# Patient Record
Sex: MALE | Race: WHITE | NOT HISPANIC OR LATINO | Employment: FULL TIME | URBAN - NONMETROPOLITAN AREA
[De-identification: names, ages, dates, MRNs, and addresses within clinical notes are randomized per-mention and may not be internally consistent; named-entity substitution may affect disease eponyms.]

---

## 2017-04-02 ENCOUNTER — HOSPITAL ENCOUNTER (EMERGENCY)
Facility: HOSPITAL | Age: 22
Discharge: HOME | End: 2017-04-03
Attending: EMERGENCY MEDICINE | Admitting: EMERGENCY MEDICINE
Payer: COMMERCIAL

## 2017-04-02 ENCOUNTER — APPOINTMENT (EMERGENCY)
Dept: RADIOLOGY | Facility: HOSPITAL | Age: 22
End: 2017-04-02
Payer: COMMERCIAL

## 2017-04-02 DIAGNOSIS — R50.9 FEVER: Primary | ICD-10-CM

## 2017-04-02 DIAGNOSIS — B34.9 VIRAL SYNDROME: ICD-10-CM

## 2017-04-02 LAB
BASOPHILS # BLD AUTO: 0.04 THOUSANDS/ΜL (ref 0–0.1)
BASOPHILS NFR BLD AUTO: 1 % (ref 0–1)
EOSINOPHIL # BLD AUTO: 0.24 THOUSAND/ΜL (ref 0–0.61)
EOSINOPHIL NFR BLD AUTO: 3 % (ref 0–6)
ERYTHROCYTE [DISTWIDTH] IN BLOOD BY AUTOMATED COUNT: 11.8 % (ref 11.6–15.1)
HCT VFR BLD AUTO: 42.9 % (ref 36.5–49.3)
HGB BLD-MCNC: 14.6 G/DL (ref 12–17)
LYMPHOCYTES # BLD AUTO: 1.47 THOUSANDS/ΜL (ref 0.6–4.47)
LYMPHOCYTES NFR BLD AUTO: 17 % (ref 14–44)
MCH RBC QN AUTO: 31.1 PG (ref 26.8–34.3)
MCHC RBC AUTO-ENTMCNC: 34 G/DL (ref 31.4–37.4)
MCV RBC AUTO: 92 FL (ref 82–98)
MONOCYTES # BLD AUTO: 1.28 THOUSAND/ΜL (ref 0.17–1.22)
MONOCYTES NFR BLD AUTO: 15 % (ref 4–12)
NEUTROPHILS # BLD AUTO: 5.71 THOUSANDS/ΜL (ref 1.85–7.62)
NEUTS SEG NFR BLD AUTO: 64 % (ref 43–75)
PLATELET # BLD AUTO: 232 THOUSANDS/UL (ref 149–390)
PMV BLD AUTO: 9.8 FL (ref 8.9–12.7)
RBC # BLD AUTO: 4.69 MILLION/UL (ref 3.88–5.62)
S PYO AG THROAT QL: NEGATIVE
WBC # BLD AUTO: 8.74 THOUSAND/UL (ref 4.31–10.16)

## 2017-04-02 PROCEDURE — 71020 HB CHEST X-RAY 2VW FRONTAL&LATL: CPT

## 2017-04-02 PROCEDURE — 36415 COLL VENOUS BLD VENIPUNCTURE: CPT | Performed by: EMERGENCY MEDICINE

## 2017-04-02 PROCEDURE — 87400 INFLUENZA A/B EACH AG IA: CPT | Performed by: EMERGENCY MEDICINE

## 2017-04-02 PROCEDURE — 85025 COMPLETE CBC W/AUTO DIFF WBC: CPT | Performed by: EMERGENCY MEDICINE

## 2017-04-02 PROCEDURE — 80053 COMPREHEN METABOLIC PANEL: CPT | Performed by: EMERGENCY MEDICINE

## 2017-04-02 PROCEDURE — 87430 STREP A AG IA: CPT | Performed by: EMERGENCY MEDICINE

## 2017-04-02 PROCEDURE — 87070 CULTURE OTHR SPECIMN AEROBIC: CPT | Performed by: EMERGENCY MEDICINE

## 2017-04-02 PROCEDURE — 87798 DETECT AGENT NOS DNA AMP: CPT | Performed by: EMERGENCY MEDICINE

## 2017-04-02 PROCEDURE — 86308 HETEROPHILE ANTIBODY SCREEN: CPT | Performed by: EMERGENCY MEDICINE

## 2017-04-02 PROCEDURE — 96360 HYDRATION IV INFUSION INIT: CPT

## 2017-04-02 RX ORDER — IBUPROFEN 400 MG/1
TABLET ORAL
Status: DISCONTINUED
Start: 2017-04-02 | End: 2017-04-03 | Stop reason: HOSPADM

## 2017-04-02 RX ORDER — IBUPROFEN 400 MG/1
400 TABLET ORAL ONCE
Status: COMPLETED | OUTPATIENT
Start: 2017-04-02 | End: 2017-04-02

## 2017-04-02 RX ADMIN — SODIUM CHLORIDE 1000 ML: 0.9 INJECTION, SOLUTION INTRAVENOUS at 23:37

## 2017-04-02 RX ADMIN — IBUPROFEN 400 MG: 400 TABLET ORAL at 22:33

## 2017-04-03 VITALS
HEIGHT: 75 IN | DIASTOLIC BLOOD PRESSURE: 70 MMHG | BODY MASS INDEX: 19.52 KG/M2 | SYSTOLIC BLOOD PRESSURE: 125 MMHG | OXYGEN SATURATION: 99 % | WEIGHT: 156.97 LBS | TEMPERATURE: 98.8 F | RESPIRATION RATE: 16 BRPM | HEART RATE: 81 BPM

## 2017-04-03 LAB
ALBUMIN SERPL BCP-MCNC: 3.7 G/DL (ref 3.5–5)
ALP SERPL-CCNC: 91 U/L (ref 46–484)
ALT SERPL W P-5'-P-CCNC: 26 U/L (ref 12–78)
ANION GAP SERPL CALCULATED.3IONS-SCNC: 12 MMOL/L (ref 4–13)
AST SERPL W P-5'-P-CCNC: 15 U/L (ref 5–45)
BACTERIA UR QL AUTO: ABNORMAL /HPF
BILIRUB SERPL-MCNC: 0.8 MG/DL (ref 0.2–1)
BILIRUB UR QL STRIP: ABNORMAL
BUN SERPL-MCNC: 12 MG/DL (ref 5–25)
CALCIUM SERPL-MCNC: 9.1 MG/DL (ref 8.3–10.1)
CHLORIDE SERPL-SCNC: 98 MMOL/L (ref 100–108)
CLARITY UR: CLEAR
CO2 SERPL-SCNC: 27 MMOL/L (ref 21–32)
COLOR UR: YELLOW
CREAT SERPL-MCNC: 1.14 MG/DL (ref 0.6–1.3)
FLUAV AG SPEC QL IA: NEGATIVE
FLUAV AG SPEC QL: NORMAL
FLUBV AG SPEC QL IA: NEGATIVE
FLUBV AG SPEC QL: NORMAL
GFR SERPL CREATININE-BSD FRML MDRD: >60 ML/MIN/1.73SQ M
GLUCOSE SERPL-MCNC: 99 MG/DL (ref 65–140)
GLUCOSE UR STRIP-MCNC: NEGATIVE MG/DL
HGB UR QL STRIP.AUTO: ABNORMAL
KETONES UR STRIP-MCNC: ABNORMAL MG/DL
LEUKOCYTE ESTERASE UR QL STRIP: NEGATIVE
NITRITE UR QL STRIP: NEGATIVE
NON-SQ EPI CELLS URNS QL MICRO: ABNORMAL /HPF
PH UR STRIP.AUTO: 6 [PH] (ref 4.5–8)
POTASSIUM SERPL-SCNC: 3.9 MMOL/L (ref 3.5–5.3)
PROT SERPL-MCNC: 7.6 G/DL (ref 6.4–8.2)
PROT UR STRIP-MCNC: ABNORMAL MG/DL
RBC #/AREA URNS AUTO: ABNORMAL /HPF
RSV B RNA SPEC QL NAA+PROBE: NORMAL
SODIUM SERPL-SCNC: 137 MMOL/L (ref 136–145)
SP GR UR STRIP.AUTO: 1.02 (ref 1–1.03)
UROBILINOGEN UR QL STRIP.AUTO: 1 E.U./DL
WBC #/AREA URNS AUTO: ABNORMAL /HPF

## 2017-04-03 PROCEDURE — 87086 URINE CULTURE/COLONY COUNT: CPT | Performed by: EMERGENCY MEDICINE

## 2017-04-03 PROCEDURE — 81001 URINALYSIS AUTO W/SCOPE: CPT | Performed by: EMERGENCY MEDICINE

## 2017-04-03 PROCEDURE — 99283 EMERGENCY DEPT VISIT LOW MDM: CPT

## 2017-04-04 LAB
BACTERIA UR CULT: NORMAL
HETEROPH AB SER QL: NEGATIVE

## 2017-04-05 LAB — BACTERIA THROAT CULT: NORMAL

## 2017-10-08 ENCOUNTER — HOSPITAL ENCOUNTER (EMERGENCY)
Facility: HOSPITAL | Age: 22
Discharge: HOME | End: 2017-10-08
Attending: EMERGENCY MEDICINE | Admitting: EMERGENCY MEDICINE
Payer: COMMERCIAL

## 2017-10-08 ENCOUNTER — OFFICE VISIT (OUTPATIENT)
Dept: URGENT CARE | Facility: CLINIC | Age: 22
End: 2017-10-08
Payer: COMMERCIAL

## 2017-10-08 ENCOUNTER — APPOINTMENT (EMERGENCY)
Dept: RADIOLOGY | Facility: HOSPITAL | Age: 22
End: 2017-10-08
Payer: COMMERCIAL

## 2017-10-08 VITALS
WEIGHT: 161.16 LBS | TEMPERATURE: 99.1 F | SYSTOLIC BLOOD PRESSURE: 119 MMHG | HEART RATE: 65 BPM | OXYGEN SATURATION: 98 % | HEIGHT: 75 IN | BODY MASS INDEX: 20.04 KG/M2 | DIASTOLIC BLOOD PRESSURE: 75 MMHG | RESPIRATION RATE: 18 BRPM

## 2017-10-08 DIAGNOSIS — M54.12 CERVICAL RADICULOPATHY: Primary | ICD-10-CM

## 2017-10-08 PROCEDURE — 73070 X-RAY EXAM OF ELBOW: CPT

## 2017-10-08 PROCEDURE — 72050 X-RAY EXAM NECK SPINE 4/5VWS: CPT

## 2017-10-08 PROCEDURE — 99213 OFFICE O/P EST LOW 20 MIN: CPT

## 2017-10-08 PROCEDURE — 99285 EMERGENCY DEPT VISIT HI MDM: CPT

## 2017-10-08 PROCEDURE — 73030 X-RAY EXAM OF SHOULDER: CPT

## 2017-10-08 RX ORDER — METHYLPREDNISOLONE 4 MG/1
TABLET ORAL
Qty: 21 TABLET | Refills: 0 | Status: SHIPPED | OUTPATIENT
Start: 2017-10-08 | End: 2020-09-24 | Stop reason: ALTCHOICE

## 2017-10-08 RX ORDER — PREDNISONE 20 MG/1
60 TABLET ORAL ONCE
Status: COMPLETED | OUTPATIENT
Start: 2017-10-08 | End: 2017-10-08

## 2017-10-08 RX ORDER — FAMOTIDINE 20 MG/1
20 TABLET, FILM COATED ORAL ONCE
Status: COMPLETED | OUTPATIENT
Start: 2017-10-08 | End: 2017-10-08

## 2017-10-08 RX ORDER — NAPROXEN 250 MG/1
500 TABLET ORAL ONCE
Status: COMPLETED | OUTPATIENT
Start: 2017-10-08 | End: 2017-10-08

## 2017-10-08 RX ORDER — NAPROXEN 500 MG/1
500 TABLET ORAL 2 TIMES DAILY WITH MEALS
Qty: 30 TABLET | Refills: 0 | Status: SHIPPED | OUTPATIENT
Start: 2017-10-08 | End: 2020-12-14

## 2017-10-08 RX ADMIN — PREDNISONE 60 MG: 20 TABLET ORAL at 04:16

## 2017-10-08 RX ADMIN — FAMOTIDINE 20 MG: 20 TABLET ORAL at 04:17

## 2017-10-08 RX ADMIN — NAPROXEN 500 MG: 250 TABLET ORAL at 03:09

## 2017-10-08 NOTE — DISCHARGE INSTRUCTIONS
Cervical Radiculopathy   WHAT YOU NEED TO KNOW:   Cervical radiculopathy is a painful condition that happens when a spinal nerve in your neck is pinched or irritated  DISCHARGE INSTRUCTIONS:   Medicines: You may need any of the following:  · NSAIDs  help decrease swelling and pain  This medicine can be bought without a doctor's order  This medicine can cause stomach bleeding or kidney problems in certain people  If you take blood thinner medicine, always ask your healthcare provider if NSAIDs are safe for you  Always read the medicine label and follow the directions on it before using this medicine  · Prescription pain medicine  helps decrease pain  Do not wait until the pain is severe before you take this medicine  · Steroids  help decrease pain and swelling  These may be given as a pill or as an injection in your neck  You may need more than 1 injection if your symptoms do not improve after the first treatment  · Take your medicine as directed  Contact your healthcare provider if you think your medicine is not helping or if you have side effects  Tell him of her if you are allergic to any medicine  Keep a list of the medicines, vitamins, and herbs you take  Include the amounts, and when and why you take them  Bring the list or the pill bottles to follow-up visits  Carry your medicine list with you in case of an emergency  Follow up with your healthcare provider or spine specialist as directed:  Write down your questions so you remember to ask them during your visits  Physical therapy:  Your healthcare provider may suggest physical therapy to stretch and strengthen your muscles  Your physical therapist can teach you how to improve your posture and the way you hold your neck  He may also teach you how to be safely active and avoid further injury  He can also help you develop an exercise program that is safe for your back and neck  Self-care:   · Ice  helps decrease swelling and pain   Ice may also help prevent tissue damage  Use an ice pack, or put crushed ice in a plastic bag  Cover it with a towel and place it on your neck for 15 to 20 minutes every hour or as directed  · Rest  when you feel it is needed  Slowly start to do more each day  Return to your daily activities as directed  · Wear a soft collar  You may be given a soft collar to support your neck while you sleep  Wear the soft collar only as directed  · Do light stretches and regular exercise  Your healthcare provider may suggest light stretches to help decrease stiffness in your neck and arm as you recover  After your pain is controlled, you may benefit from regular exercise  Ask what type of exercise is safe for your back and neck  · Review your work area  A comfortable work area can help prevent neck strain  Ask your employer for an ergonomic review to check the position of your desk, chair, phone, and computer  Make any necessary adjustments for your comfort  Contact your healthcare provider or spine specialist if:   · You have a fever  · You are losing weight without trying  · Your pain is worse, even with medicine  · One or both hands feel more numb than before, or you cannot move your fingers well  · You have questions or concerns about your condition or care  © 2017 [address] [employer]  Information is for End User's use only and may not be sold, redistributed or otherwise used for commercial purposes  All illustrations and images included in CareNotes® are the copyrighted property of [employer]  or Jayme Soliz  The above information is an  only  It is not intended as medical advice for individual conditions or treatments  Talk to your doctor, nurse or pharmacist before following any medical regimen to see if it is safe and effective for you    Fill rx for naprosyn or Aleve 2 tabs twice daily with food OR ibuprofen 200-800mg every 8 hours with food as needed for pain  Medrol Dose Ravi: take entire row of steriods for a particular day at one sitting  take with food  finish entire course  Famotidine (pepcid) 20mg twice daily OR ranitidine (Zantac) 150mg twice daily to cut stomach acid

## 2017-10-08 NOTE — ED PROVIDER NOTES
History  Chief Complaint   Patient presents with    Extremity Weakness     Pt states he woke from sleep 2 days ago with pain in left sholder radiating down left arm - denies shoulder pain at present, but states left elbow, FA and hand are throbbing with weakness in left hand and tingling in dorsal aspect left hand     22 yo male right hand dominant male awoke 2 days ago with a pinching sensation to the left suprascapular region which radiated down his triceps and into his forearm that he had some left hand weakness  He was at work the other day he was at rest when he had a sensation of his entire hand going numb briefly  The left  shoulder pain resolved he has more discomfort to the triceps region and right lateral aspect of the elbow as well as forearm and mid dorsum of the hand  Denies neck pain  He has had no fevers  No history of trauma or  shoulder pain  He denies any chest pain shortness of breath pleuritic pain no abdominal pain he took some Aleve and seem to help  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 and immediately afterwards he had worsening burning pain to the left elbow region  He has noted no swelling no rash no other joint involvement  None       Past Medical History:   Diagnosis Date    Vitamin D deficiency        History reviewed  No pertinent surgical history  History reviewed  No pertinent family history  I have reviewed and agree with the history as documented  Social History   Substance Use Topics    Smoking status: Never Smoker    Smokeless tobacco: Never Used    Alcohol use No        Review of Systems   Constitutional: Positive for activity change  Negative for chills, diaphoresis and fatigue  HENT: Negative for congestion and sore throat  Eyes: Negative for discharge and visual disturbance  Respiratory: Negative for shortness of breath  Cardiovascular: Negative for chest pain  Gastrointestinal: Negative for abdominal pain and nausea  Genitourinary: Negative for difficulty urinating  Musculoskeletal: Negative for arthralgias, neck pain and neck stiffness  Skin: Negative for rash  Neurological: Positive for weakness and numbness  Negative for dizziness, speech difficulty, light-headedness and headaches  Psychiatric/Behavioral: Negative for confusion  All other systems reviewed and are negative  Physical Exam  ED Triage Vitals   Temperature Pulse Respirations Blood Pressure SpO2   10/08/17 0203 10/08/17 0203 10/08/17 0203 10/08/17 0203 10/08/17 0203   99 1 °F (37 3 °C) 71 18 131/72 97 %      Temp Source Heart Rate Source Patient Position - Orthostatic VS BP Location FiO2 (%)   10/08/17 0203 10/08/17 0203 10/08/17 0203 10/08/17 0203 --   Temporal Monitor Sitting Right arm       Pain Score       10/08/17 0156       7           Physical Exam   Constitutional: He is oriented to person, place, and time  He appears well-developed  No distress  HENT:   Head: Normocephalic and atraumatic  Right Ear: External ear normal    Left Ear: External ear normal    Nose: Nose normal    Mouth/Throat: Oropharynx is clear and moist  No oropharyngeal exudate  TMS pale   Eyes: Conjunctivae and EOM are normal  Pupils are equal, round, and reactive to light  Right eye exhibits no discharge  Left eye exhibits no discharge  Neck: Normal range of motion  Neck supple  Neck no midline or paraspinous tenderness   Cardiovascular: Normal rate, regular rhythm and intact distal pulses  Pulmonary/Chest: Effort normal and breath sounds normal  No respiratory distress  He has no wheezes  Abdominal: Soft  Bowel sounds are normal  He exhibits no distension and no mass  There is no tenderness  There is no guarding  Back no midline T or L spine tenderness   Musculoskeletal:        Left shoulder: Normal         Left elbow: He exhibits normal range of motion, no swelling, no effusion and no deformity  Tenderness found  Lateral epicondyle tenderness noted   No radial head, no medial epicondyle and no olecranon process tenderness noted  Left wrist: Normal         Left forearm: He exhibits no tenderness, no bony tenderness, no swelling, no edema and no deformity  Left hand: He exhibits normal range of motion, no tenderness, no bony tenderness, normal capillary refill, no deformity, no laceration and no swelling  Normal sensation noted  Decreased sensation is not present in the ulnar distribution, is not present in the medial redistribution and is not present in the radial distribution  Decreased strength noted  He exhibits wrist extension trouble  He exhibits no finger abduction and no thumb/finger opposition  Lymphadenopathy:     He has no cervical adenopathy  Neurological: He is alert and oriented to person, place, and time  He displays normal reflexes  A sensory deficit (incomplete C5-C7 dermatome to LUE) is present  No cranial nerve deficit  He exhibits normal muscle tone  Coordination normal    Gait steady no pronator drift; left  slightly weaker   Skin: Skin is warm and dry  Capillary refill takes less than 2 seconds  He is not diaphoretic  Psychiatric: He has a normal mood and affect  ED Medications  Medications   naproxen (NAPROSYN) tablet 500 mg (500 mg Oral Given 10/8/17 0309)   predniSONE tablet 60 mg (60 mg Oral Given 10/8/17 0416)   famotidine (PEPCID) tablet 20 mg (20 mg Oral Given 10/8/17 0417)       Diagnostic Studies  Labs Reviewed - No data to display    XR elbow 2 vw LEFT   ED Interpretation   Read by me; Radiologist to provide formal interpretation  No   acute fracture      Final Result      No acute osseous abnormality  Workstation performed: [id number]         XR shoulder 2+ views LEFT   ED Interpretation   Read by me; Radiologist to provide formal interpretation  No   acute fracture      Final Result      No acute osseous abnormality           Workstation performed: [id number]         XR spine cervical complete 4 or 5 vw non injury   ED Interpretation   Read by me; Radiologist to provide formal interpretation  No   acute fracture or dislocation      Final Result      Unremarkable cervical spine  Workstation performed: [id number]             Procedures  Procedures      Phone Contacts  ED Phone Contact    ED Course  ED Course                                MDM  Number of Diagnoses or Management Options  Cervical radiculopathy:   Diagnosis management comments: Mdm: cervical radiculopathy, brachial plexopathy, shoulder impingement syndrome will eval plain films and refer to occupational medicine as well as Dr Granado Memory will intiate NSAIDS and steriods burst    CritCare Time    Disposition  Final diagnoses:   Cervical radiculopathy - left c5-c7 incomplete vs  brachial plexopathy vs  shoulder impingement     ED Disposition     ED Disposition Condition Comment    Discharge  [facility] discharge to home/self care  Condition at discharge: Good        Follow-up Information     Follow up With Specialties Details Why Bereket Covington DO Anesthesiology, Pain Medicine Call in 1 day  [address] [place] [address]  [phone]      [facility] today [facility] [address] [facility] [address]  [phone]        Discharge Medication List as of 10/8/2017  6:06 AM      START taking these medications    Details   methylprednisolone (MEDROL) 4 mg tablet Take as directed with food, Print      naproxen (NAPROSYN) 500 mg tablet Take 1 tablet by mouth 2 (two) times a day with meals, Starting Sun 10/8/2017, Print           No discharge procedures on file      ED Provider  Electronically Signed by       Tej Low MD  10/08/17 7152

## 2017-10-13 ENCOUNTER — HOSPITAL ENCOUNTER (EMERGENCY)
Facility: HOSPITAL | Age: 22
Discharge: HOME | End: 2017-10-13
Attending: EMERGENCY MEDICINE | Admitting: EMERGENCY MEDICINE
Payer: COMMERCIAL

## 2017-10-13 VITALS
OXYGEN SATURATION: 98 % | WEIGHT: 170 LBS | RESPIRATION RATE: 16 BRPM | SYSTOLIC BLOOD PRESSURE: 144 MMHG | DIASTOLIC BLOOD PRESSURE: 82 MMHG | TEMPERATURE: 98.8 F | BODY MASS INDEX: 21.25 KG/M2 | HEART RATE: 52 BPM

## 2017-10-13 DIAGNOSIS — M54.12 CERVICAL RADICULOPATHY: Primary | ICD-10-CM

## 2017-10-13 DIAGNOSIS — M62.838 MUSCLE SPASM: ICD-10-CM

## 2017-10-13 PROCEDURE — 99283 EMERGENCY DEPT VISIT LOW MDM: CPT

## 2017-10-13 PROCEDURE — 96372 THER/PROPH/DIAG INJ SC/IM: CPT

## 2017-10-13 RX ORDER — METHOCARBAMOL 500 MG/1
500 TABLET, FILM COATED ORAL 2 TIMES DAILY
Qty: 20 TABLET | Refills: 0 | Status: SHIPPED | OUTPATIENT
Start: 2017-10-13 | End: 2020-12-14

## 2017-10-13 RX ORDER — GABAPENTIN 300 MG/1
300 CAPSULE ORAL DAILY
Qty: 20 CAPSULE | Refills: 0 | Status: SHIPPED | OUTPATIENT
Start: 2017-10-13 | End: 2020-12-14

## 2017-10-13 RX ORDER — KETOROLAC TROMETHAMINE 30 MG/ML
30 INJECTION, SOLUTION INTRAMUSCULAR; INTRAVENOUS ONCE
Status: COMPLETED | OUTPATIENT
Start: 2017-10-13 | End: 2017-10-13

## 2017-10-13 RX ORDER — METHOCARBAMOL 500 MG/1
500 TABLET, FILM COATED ORAL ONCE
Status: COMPLETED | OUTPATIENT
Start: 2017-10-13 | End: 2017-10-13

## 2017-10-13 RX ORDER — GABAPENTIN 300 MG/1
300 CAPSULE ORAL ONCE
Status: COMPLETED | OUTPATIENT
Start: 2017-10-13 | End: 2017-10-13

## 2017-10-13 RX ADMIN — GABAPENTIN 300 MG: 300 CAPSULE ORAL at 01:40

## 2017-10-13 RX ADMIN — KETOROLAC TROMETHAMINE 30 MG: 30 INJECTION, SOLUTION INTRAMUSCULAR at 01:41

## 2017-10-13 RX ADMIN — METHOCARBAMOL 500 MG: 500 TABLET ORAL at 01:40

## 2017-10-13 NOTE — ED NOTES
Lying quietly using phone - states pain is improved to [address]  Salbador Bales, SAUNDRA  10/13/17 0303

## 2017-10-13 NOTE — DISCHARGE INSTRUCTIONS
Cervical Radiculopathy   Yovanny Gomez, Jim FORTE, & Shiela BK: Cervical Spondylosis  In: Erik FJ, ed  The 5-Minute Clinical Consult 2013, 21st ed  [address] [facility] [place], [place], 2013  Genia FORTE & Jamie DIAZV: Cervical radiculopathy: pathophysiology, presentation, and clinical evaluation  Neurosurgery 2007; 60(1 Supp1 1):S28-S34  Rocco S & Jess MG: Clinical practice  Cervical radiculopathy  N Engl J Med 2005; 353(4):392-399  Ken I & Marilin C: Cervical Radiculopathy  In: MICHEL[address], [address], eds  Essentials of Physical Medicine and Rehabilitation: Musculoskeletal Disorders, Pain, and Rehabilitation, 2nd ed  [address] [facility], [place], [place], 2008  Gypsy Post, & Chandler MV: Cervical Radiculopathy  In: ESE SD, ed  Pain Management, [address] [facility], [place], [place], 2007 © 2017 [address] [facility] Information is for End User's use only and may not be sold, redistributed or otherwise used for commercial purposes  All illustrations and images included in CareNotes® are the copyrighted property of [employer] , [employer]  or Jayme Soliz  The above information is an  only  It is not intended as medical advice for individual conditions or treatments  Talk to your doctor, nurse or pharmacist before following any medical regimen to see if it is safe and effective for you  Chronic Neck Pain   WHAT YOU NEED TO KNOW:   Chronic neck pain may start to build slowly over time  Neck pain is chronic if it lasts longer than 3 months  The pain may come and go, or be worse with certain movements  The pain may be only in your neck, or it may move to your arms, back, or shoulders  You may have pain that starts in another body area and moves to your neck  You may have neck pain for years  Some types of neck pain can be permanent          DISCHARGE INSTRUCTIONS:   Seek care immediately if:   · You have an injury that causes neck pain and shooting pain down your arms or legs  · Your neck pain suddenly becomes severe  · You have neck pain along with numbness, tingling, or weakness in your arms or legs  · You have a stiff neck, a headache, and a fever  Contact your healthcare provider if:   · You have new or worsening symptoms  · Your symptoms continue even after treatment  · You have questions or concerns about your condition or care  Medicines: You may need any of the following:  · NSAIDs , such as ibuprofen, help decrease swelling, pain, and fever  This medicine is available without a doctor's order  Ask your healthcare provider which medicine to take and how often to take it  Follow directions  NSAIDs can cause stomach bleeding or kidney problems if not taken correctly  If you take blood thinner medicine, always ask if NSAIDs are safe for you  · Acetaminophen  helps decrease pain and fever  Ask your healthcare provider how much to take and how often to take it  Follow directions  Acetaminophen can cause liver damage if not taken correctly  · Steroid medicine  may be used to reduce inflammation  This can help relieve pain caused by swelling  · Take your medicine as directed  Contact your healthcare provider if you think your medicine is not helping or if you have side effects  Tell him or her if you are allergic to any medicine  Keep a list of the medicines, vitamins, and herbs you take  Include the amounts, and when and why you take them  Bring the list or the pill bottles to follow-up visits  Carry your medicine list with you in case of an emergency  Manage or prevent chronic neck pain:   · Rest your neck as directed  Do not make sudden movements, such as turning your head quickly  Your healthcare provider may recommend you wear a cervical collar for a short time  The collar will prevent you from moving your head  This will give your neck time to heal if an injury is causing your neck pain   Ask your healthcare provider when you can return to sports or other normal daily activities  · Apply heat as directed  Heat helps relieve pain and swelling  Use a heat wrap, or soak a small towel in warm water  Wring out the extra water  Apply the heat wrap or towel for 20 minutes every hour, or as directed  · Apply ice as directed  Ice helps relieve pain and swelling, and can help prevent tissue damage  Use an ice pack, or put ice in a bag  Cover the ice pack or back with a towel before you apply it to your neck  Apply the ice pack or ice for 15 minutes every hour, or as directed  Your healthcare provider can tell you how often to apply ice  · Do neck exercises as directed  Neck exercises help strengthen the muscles and increase range of motion  Your healthcare provider will tell you which exercises are right for you  He may give you instructions, or he may recommend that you work with a physical therapist  Your healthcare provider or therapist can make sure you are doing the exercises correctly  · Maintain good posture  Try to keep your head and shoulders lifted when you sit  If you work in front of a computer, make sure the monitor is at the right level  You should not need to look up down to see the screen  You should also not have to lean forward to be able to read what is on the screen  Make sure your keyboard, mouse, and other computer items are placed where you do not have to extend your shoulder to reach them  Get up often if you work in front of a computer or sit for long periods of time  Stretch or walk around to keep your neck muscles loose  Follow up with your healthcare provider as directed: Your healthcare provider may refer you to a specialist if your pain does not get better with treatment  Write down your questions so you remember to ask them during your visits  © 2017 [employer] Information is for End User's use only and may not be sold, redistributed or otherwise used for commercial purposes   All illustrations and images included in CareNotes® are the copyrighted property of [employer]  or Jayme Soliz  The above information is an  only  It is not intended as medical advice for individual conditions or treatments  Talk to your doctor, nurse or pharmacist before following any medical regimen to see if it is safe and effective for you

## 2017-10-13 NOTE — ED PROVIDER NOTES
Pt Name: Francisco Fuentes  MRN: [id number]  [place] 1995  Age/Sex: 23 y o  male  Date of evaluation: 10/13/2017  PCP: Trevin Schmitz, [address] [place] [address]    Chief Complaint   Patient presents with    Arm Pain     Patient was recently seen in the ED for left arm/shoulder pain  Patient stated pain has gotten worse and now it feels as though someone is running a knife down his arm  Patient has been taking medrol and aleve  Patient followed up with urgent care and was told to keep wt restrictions  and possibly start PT  HPI    Ne[place] Rey presents to the Emergency Department complaining of left shoulder and arm pain  History provided by:  Patient   used: No    Shoulder Pain   Location:  Shoulder, arm and elbow  Shoulder location:  L shoulder  Arm location:  L arm  Elbow location:  L elbow  Injury: no    Pain details:     Quality:  Shooting    Severity:  Severe    Onset quality:  Sudden    Timing:  Constant    Progression:  Worsening  Dislocation: no    Prior injury to area:  No  Relieved by:  Nothing  Worsened by:  Nothing  Ineffective treatments:  Physical therapy and NSAIDs  Associated symptoms: decreased range of motion, neck pain, numbness, stiffness and tingling    Associated symptoms: no back pain, no fatigue and no fever          Past Medical and Surgical History    Past Medical History:   Diagnosis Date    Vitamin D deficiency        History reviewed  No pertinent surgical history  History reviewed  No pertinent family history  Social History   Substance Use Topics    Smoking status: Never Smoker    Smokeless tobacco: Never Used    Alcohol use No              Allergies    No Known Allergies    Home Medications    Prior to Admission medications    Medication Sig Start Date End Date Taking?  Authorizing Provider   methylprednisolone (MEDROL) 4 mg tablet Take as directed with food 10/8/17  Yes Margarita Kwok MD   naproxen (NAPROSYN) 500 mg tablet Take 1 tablet by mouth 2 (two) times a day with meals 10/8/17   Danita Bonner MD           Review of Systems    Review of Systems   Constitutional: Negative for activity change, appetite change, chills, fatigue and fever  HENT: Negative for congestion, rhinorrhea, sinus pressure, sneezing, sore throat and trouble swallowing  Eyes: Negative for photophobia and visual disturbance  Respiratory: Negative for chest tightness, shortness of breath and wheezing  Cardiovascular: Negative for chest pain and leg swelling  Gastrointestinal: Negative for abdominal distention, abdominal pain, constipation, diarrhea, nausea and vomiting  Endocrine: Negative for polydipsia, polyphagia and polyuria  Genitourinary: Negative for decreased urine volume, difficulty urinating, dysuria, flank pain, frequency and urgency  Musculoskeletal: Positive for neck pain, neck stiffness and stiffness  Negative for back pain, gait problem and joint swelling  Skin: Negative for color change, pallor and rash  Allergic/Immunologic: Negative for immunocompromised state  Neurological: Negative for seizures, syncope, speech difficulty, weakness, light-headedness and headaches  Psychiatric/Behavioral: Negative for confusion  All other systems reviewed and are negative  Physical Exam      ED Triage Vitals [10/13/17 0101]   Temperature Pulse Respirations Blood Pressure SpO2   98 8 °F (37 1 °C) 76 18 134/86 98 %      Temp Source Heart Rate Source Patient Position - Orthostatic VS BP Location FiO2 (%)   Temporal Monitor Sitting Right arm --      Pain Score       Worst Possible Pain               Physical Exam   Constitutional: He is oriented to person, place, and time  He appears well-developed and well-nourished  No distress  HENT:   Head: Normocephalic and atraumatic  Nose: Nose normal    Mouth/Throat: Oropharynx is clear and moist    Eyes: Conjunctivae and EOM are normal  Pupils are equal, round, and reactive to light  Neck: Normal range of motion  Neck supple  Cardiovascular: Normal rate, regular rhythm and normal heart sounds  Exam reveals no gallop and no friction rub  No murmur heard  Pulmonary/Chest: Effort normal and breath sounds normal  No respiratory distress  He has no wheezes  He has no rales  Abdominal: Soft  Bowel sounds are normal  There is no tenderness  There is no rebound and no guarding  Musculoskeletal:        Left shoulder: He exhibits decreased range of motion, tenderness, bony tenderness, pain and spasm  Arms:  Neurological: He is alert and oriented to person, place, and time  Skin: Skin is warm and dry  He is not diaphoretic  Psychiatric: He has a normal mood and affect  His behavior is normal    Nursing note and vitals reviewed  Assessment and Plan    Jmae Eastman is a 23 y o  male who presents with left arm pain  Physical examination remarkable for tenderness to palpation as well as with minimal active and passive ROM  Differential diagnosis (not completely inclusive) includes spasm/ radiculopathy  Plan will be to  treat symptomatically  He was seen in the ED this week and had x-rays that were normal   He followed up at urgent care and feels that the pain is increasing  MDM    Diagnostic Results        Labs: All labs reviewed and utilized in the medical decision making process    Radiology:    No orders to display       All radiology studies independently viewed by me and interpreted by the radiologist     Procedure    Procedures    CritCare Time      ED Course of Care and Re-Assessments      Patient feeling slightly better after meds       Medications   ketorolac (TORADOL) 30 mg/mL injection 30 mg (30 mg Intramuscular Given 10/13/17 0141)   methocarbamol (ROBAXIN) tablet 500 mg (500 mg Oral Given 10/13/17 0140)   gabapentin (NEURONTIN) capsule 300 mg (300 mg Oral Given 10/13/17 0140)           FINAL IMPRESSION    Final diagnoses:   Cervical radiculopathy   Muscle spasm         DISPOSITION/PLAN      ED Disposition     ED Disposition Condition Comment    Discharge  [facility] discharge to home/self care  Condition at discharge: Good        Follow-up Information     Follow up With Specialties Details Why Contact Info Additional Gavin Love4, DO Family Medicine Schedule an appointment as soon as possible for a visit today  [address] Emergency Department Emergency Medicine Go to As needed, If symptoms worsen [facility] ED, [place] [address], [place], [place], [address] TO:    Osiel Yoon, DO  [address] [facility]  [phone]    Schedule an appointment as soon as possible for a visit today      [facility] Emergency Department  [facility] [address] [address] [phone]  Go to  As needed, If symptoms worsen      DISCHARGE MEDICATIONS:    Discharge Medication List as of 10/13/2017  3:02 AM      START taking these medications    Details   gabapentin (NEURONTIN) 300 mg capsule Take 1 capsule by mouth daily, Starting Fri 10/13/2017, Print      methocarbamol (ROBAXIN) 500 mg tablet Take 1 tablet by mouth 2 (two) times a day, Starting Fri 10/13/2017, Print         CONTINUE these medications which have NOT CHANGED    Details   methylprednisolone (MEDROL) 4 mg tablet Take as directed with food, Print      naproxen (NAPROSYN) 500 mg tablet Take 1 tablet by mouth 2 (two) times a day with meals, Starting Sun 10/8/2017, Print             No discharge procedures on file           Stephany Beryr, [address], DO  10/13/17 9832

## 2018-01-09 ENCOUNTER — OFFICE VISIT (OUTPATIENT)
Dept: URGENT CARE | Facility: CLINIC | Age: 23
End: 2018-01-09
Payer: COMMERCIAL

## 2018-01-09 DIAGNOSIS — B34.9 VIRAL INFECTION: ICD-10-CM

## 2018-01-09 DIAGNOSIS — R50.9 FEVER: ICD-10-CM

## 2018-01-09 PROCEDURE — 99213 OFFICE O/P EST LOW 20 MIN: CPT

## 2018-01-10 ENCOUNTER — APPOINTMENT (OUTPATIENT)
Dept: LAB | Facility: HOSPITAL | Age: 23
End: 2018-01-10
Payer: COMMERCIAL

## 2018-01-10 DIAGNOSIS — R50.9 FEVER: ICD-10-CM

## 2018-01-10 DIAGNOSIS — B34.9 VIRAL INFECTION: ICD-10-CM

## 2018-01-10 PROCEDURE — 87798 DETECT AGENT NOS DNA AMP: CPT

## 2018-01-11 LAB
FLUAV AG SPEC QL: NORMAL
FLUBV AG SPEC QL: NORMAL
RSV B RNA SPEC QL NAA+PROBE: NORMAL

## 2018-01-16 NOTE — PROGRESS NOTES
Assessment   1  Viral illness (966 86) (B34 9)    Plan   Viral illness    · Oseltamivir Phosphate 75 MG Oral Capsule (Tamiflu); TAKE 1 CAPSULE TWICE    DAILY    Discussion/Summary   Discussion Summary:    Appears viral  Start Tamiflu and take as directed  Will send a flu swab for further testing  Medication Side Effects Reviewed: Possible side effects of new medications were reviewed with the patient/guardian today  Understands and agrees with treatment plan: The treatment plan was reviewed with the patient/guardian  The patient/guardian understands and agrees with the treatment plan      Chief Complaint   1  Nasal Symptoms  Chief Complaint Free Text Note Form: Pt c/o sinus congestion, fever, and nausea since yesterday  History of Present Illness   HPI: 63-year-old male here with sinus congestion, fever and nausea that started yesterday  Also has some body aches and has had An episode of vomiting  Denies any cough  Hospital Based Practices Required Assessment:      Abuse And Domestic Violence Screen       Yes, the patient is safe at home  -- The patient states no one is hurting them  Depression And Suicide Screen  No, the patient has not had thoughts of hurting themself  No, the patient has not felt depressed in the past 7 days  Prefered Language is  [place]  Primary Language is  English  Nasal Symptoms:      Associated symptoms include nasal congestion,-- postnasal drainage-- and-- fever, but-- no cough  Review of Systems   Focused-Male:      Constitutional: fever,-- feeling poorly,-- chills-- and-- feeling tired, but-- as noted in HPI       ENT: nasal discharge, but-- as noted in HPI  Cardiovascular: no complaints of slow or fast heart rate, no chest pain, no palpitations, no leg claudication or lower extremity edema  Respiratory: no complaints of shortness of breath, no wheezing or cough, no dyspnea on exertion, no orthopnea or PND        Gastrointestinal: nausea-- and-- vomiting, but-- as noted in HPI  ROS Reviewed:    ROS reviewed  Active Problems   1  Acute viral pharyngitis (462) (J02 8,B97 89)   2  History of allergy (V15 09) (Z88 9)    Past Medical History   1  History of pharyngitis (V12 69) (Z87 09)   2  History of sinusitis (V12 69) (Z87 09)   3  History of Left ankle sprain (845 00) (W62 441I)  Active Problems And Past Medical History Reviewed: The active problems and past medical history were reviewed and updated today  Family History   Mother    1  Family history of Factor V Leiden (289 81) (D68 51)  Grandmother    2  Family history of Breast cancer (174 9) (C50 919)   3  Family history of Heart disease (429 9) (I51 9)  Maternal Grandfather    4  Family history of Prostate cancer (185) (C61)  Uncle    5  Family history of Factor V Leiden (289 81) (D68 51)   6  Family history of Lung cancer (162 9) (C34 90)  Family History    7  Family history of Cancer   8  Family history of Reported Family History Of Heart Disease  Family History Reviewed: The family history was reviewed and updated today  Social History    · Never a smoker   · Never A Smoker  Social History Reviewed: The social history was reviewed and updated today  The social history was reviewed and is unchanged  Surgical History   1  Denied: History Of Prior Surgery  Surgical History Reviewed: The surgical history was reviewed and updated today  Current Meds   Medication List Reviewed: The medication list was reviewed and updated today  Allergies   1  No Known Drug Allergies    Vitals   Signs   Recorded: [id number] 05:27PM   Temperature: 98 3 F  Heart Rate: 89  Respiration: 18  Systolic: 084  Diastolic: 67  Height: 6 ft 2 in  Weight: 173 lb 15 07 oz  BMI Calculated: 22 33  BSA Calculated: 2 05  O2 Saturation: 99    Physical Exam        Constitutional      General appearance: No acute distress, well appearing and well nourished         Ears, Nose, Mouth, and Throat      External inspection of ears and nose: Normal        Otoscopic examination: Tympanic membrance translucent with normal light reflex  Canals patent without erythema  Nasal mucosa, septum, and turbinates: Abnormal   There was a mucoid discharge from both nares  The bilateral nasal mucosa was red  Oropharynx: Abnormal   The posterior pharynx was not erythematous-- and-- did not have an exudate  Pulmonary      Respiratory effort: No increased work of breathing or signs of respiratory distress  Auscultation of lungs: Clear to auscultation  Cardiovascular      Auscultation of heart: Normal rate and rhythm, normal S1 and S2, without murmurs         Signatures    Electronically signed by : Cara Ritter PAC; Jan 9 2018  5:44PM EST                       (Author)     Electronically signed by : Scott Cushing, D O ; Chucky 15 2018 11:33AM EST                       (Co-author)

## 2018-01-23 VITALS
SYSTOLIC BLOOD PRESSURE: 133 MMHG | HEIGHT: 74 IN | BODY MASS INDEX: 22.32 KG/M2 | RESPIRATION RATE: 18 BRPM | DIASTOLIC BLOOD PRESSURE: 67 MMHG | OXYGEN SATURATION: 99 % | WEIGHT: 173.94 LBS | TEMPERATURE: 98.3 F | HEART RATE: 89 BPM

## 2018-03-12 ENCOUNTER — TRANSCRIBE ORDERS (OUTPATIENT)
Dept: ADMINISTRATIVE | Facility: HOSPITAL | Age: 23
End: 2018-03-12

## 2018-03-12 DIAGNOSIS — I36.1 TRICUSPID VALVE INSUFFICIENCY, NON-RHEUMATIC: Primary | ICD-10-CM

## 2020-09-24 ENCOUNTER — TELEPHONE (OUTPATIENT)
Dept: URGENT CARE | Facility: CLINIC | Age: 25
End: 2020-09-24

## 2020-09-24 ENCOUNTER — OFFICE VISIT (OUTPATIENT)
Dept: URGENT CARE | Facility: CLINIC | Age: 25
End: 2020-09-24
Payer: COMMERCIAL

## 2020-09-24 VITALS
WEIGHT: 175 LBS | HEIGHT: 75 IN | HEART RATE: 90 BPM | RESPIRATION RATE: 18 BRPM | OXYGEN SATURATION: 99 % | TEMPERATURE: 99.3 F | BODY MASS INDEX: 21.76 KG/M2

## 2020-09-24 DIAGNOSIS — R05.9 COUGH: ICD-10-CM

## 2020-09-24 DIAGNOSIS — J06.9 VIRAL UPPER RESPIRATORY TRACT INFECTION: Primary | ICD-10-CM

## 2020-09-24 PROCEDURE — 99213 OFFICE O/P EST LOW 20 MIN: CPT | Performed by: PHYSICIAN ASSISTANT

## 2020-09-24 PROCEDURE — U0003 INFECTIOUS AGENT DETECTION BY NUCLEIC ACID (DNA OR RNA); SEVERE ACUTE RESPIRATORY SYNDROME CORONAVIRUS 2 (SARS-COV-2) (CORONAVIRUS DISEASE [COVID-19]), AMPLIFIED PROBE TECHNIQUE, MAKING USE OF HIGH THROUGHPUT TECHNOLOGIES AS DESCRIBED BY CMS-2020-01-R: HCPCS | Performed by: PHYSICIAN ASSISTANT

## 2020-09-24 NOTE — PROGRESS NOTES
[facility]        NAME: Julio Birmingham is a 23 y o  male  : 1995    MRN: [id number]  DATE: 2020  TIME: 5:54 PM    Assessment and Plan   Viral upper respiratory tract infection [J06 9]  1  Viral upper respiratory tract infection     2  Cough  Novel Coronavirus (COVID-19), PCR [facility] - Office Collection         Patient Instructions   Patient Instructions     Novel Coronavirus   WHAT YOU NEED TO KNOW:   What is the novel coronavirus (nCoV)? The nCoV is a new strain (type) of coronavirus  Coronaviruses often cause mild respiratory (lung) infections, such as the common cold  They can also cause more severe infections  Examples include acute respiratory syndrome (SARS), Middle East respiratory syndrome (MERS), pneumonia, and bronchitis  The nCoV can cause more severe symptoms than earlier coronaviruses  The nCoV was first found in individuals in part [facility] at the end of   The virus is spreading as people who are infected travel to other parts of the world  What are the signs and symptoms of nCoV? Signs and symptoms can take 2 to 14 days to develop and range from mild to severe:  · Fever    · Cough    · Shortness of breath or trouble breathing    · Pneumonia (in severe cases)    How does nCoV spread? It is not known for sure how the virus spreads  The virus may spread in droplets when an infected person coughs or sneezes  Others become infected by breathing in the virus or getting the virus in their eyes  The virus can also possibly spread if a person touches certain animals, such as in a live market  How is nCoV diagnosed? If you develop symptoms of nCoV, you may need to be checked  Call your doctor if you traveled within the past 14 days to an area where nCoV is active  Call your doctor if you have close contact with someone else who did  Your doctor will tell you what to do  He or she will need to take precautions in the office to protect staff members and other patients  Your doctor will take samples from your airway  The samples have to be sent to the Centers for Disease Control and Prevention (CDC) to be tested  What should I do if I have nCoV? No treatment is available for nCoV  Medicine may be given to help relieve your cough or fever, or to help you breathe more easily  Severe nCoV may need to be treated in the hospital  In the hospital, you may need breathing treatment or support, such as extra oxygen  The following can help you prevent spreading nCoV to others  Have anyone you are caring for who has nCoV also use the guidelines  · Limit close contact with others  Stay in a different room, or sleep in a separate bed  Remind others to stay at least 6 feet (2 meters) away from you while you are sick  Only go out of your house if you are going to a medical appointment  While you are recovering, always call the office of any healthcare provider you seeing  The provider will need to prepare for your arrival to keep others safe  · Wear a medical mask when you are around others  A medical mask will help prevent you from spreading the virus  You can ask others around you to wear a medical mask if you are not able to wear one  · Cover your mouth and nose to sneeze or cough  Sneeze and cough into a tissue that covers your mouth and nose  Use the bend of our arm if you do not have a tissue  Throw the tissue away in a trash can right away  Then wash your hands well with soap and water  Use hand  that contains alcohol if you cannot wash your hands  · Wash your hands often  You and everyone in your home must wash your hands throughout the day  Use soap and water  Use germ-killing gel if soap and water are not available  Do not touch your eyes or mouth if you have not washed your hands  · Do not share items with other people  Do not share dishes, towels, or other items with anyone  Always wash items after you use them      · Clean frequently touched surfaces often   Use household  or bleach diluted with water to clean counters, doorknobs, toilet seats, and other surfaces  · Do not handle live animals  You may be able to spread nCoV to animals, including pets  Until more is known, it is best not to touch, play with, or handle live animals while you are sick  What can I do to relieve my symptoms? The following may help if you have mild symptoms:  · Drink more liquids as directed  Liquids will help thin and loosen mucus so you can cough it up  Liquids will also help prevent dehydration  Liquids that help prevent dehydration include water, fruit juice, and broth  Do not drink liquids that contain caffeine  Caffeine can increase your risk for dehydration  Ask your healthcare provider how much liquid to drink each day  · Soothe a sore throat  Gargle with warm salt water  This helps your sore throat feel better  Make salt water by dissolving ¼ teaspoon salt in 1 cup warm water  You may also suck on hard candy or throat lozenges  You may use a sore throat spray  · Use a humidifier or vaporizer  Use a cool mist humidifier or a vaporizer to increase air moisture in your home  This may make it easier for you to breathe and help decrease your cough  · Use saline nasal drops as directed  These help relieve congestion  · Apply petroleum-based jelly around the outside of your nostrils  This can decrease irritation from blowing your nose  · Do not smoke  Nicotine and other chemicals in cigarettes and cigars can make your symptoms worse  They can also cause infections such as bronchitis or pneumonia  Ask your healthcare provider for information if you currently smoke and need help to quit  E-cigarettes or smokeless tobacco still contain nicotine  Talk to your healthcare provider before you use these products  Call your local emergency number [phone] in the [address] for any of the following:  Tell the  you may have nCoV   This will help anyone in the ambulance stay safe, and to call ahead to the hospital   · You have sudden shortness of breath  · You have a fast heartbeat or chest pain  When should I seek immediate care? · You feel so dizzy that you have trouble standing up  · Your lips and fingernails turn blue  When should I call my doctor? · You have a fever over 102ºF (39ºC)  · Your sore throat gets worse or you see white or yellow spots in your throat  · Your symptoms get worse after 3 to 5 days or your cold is not better in 14 days  · You have a rash anywhere on your skin  · You have large, tender lumps in your neck  · You have thick, green, or yellow drainage from your nose  · You cough up thick yellow, green, or bloody mucus  · You are vomiting for more than 24 hours and cannot keep fluids down  · You have a bad earache  · You have questions or concerns about your condition or care  CARE AGREEMENT:   You have the right to help plan your care  Learn about your health condition and how it may be treated  Discuss treatment options with your healthcare providers to decide what care you want to receive  You always have the right to refuse treatment  The above information is an  only  It is not intended as medical advice for individual conditions or treatments  Talk to your doctor, nurse or pharmacist before following any medical regimen to see if it is safe and effective for you  © Copyright [address] Information is for End User's use only and may not be sold, redistributed or otherwise used for commercial purposes  All illustrations and images included in CareNotes® are the copyrighted property of A D A M , Inc  or [facility]           Follow up with PCP in 3-5 days  Proceed to  ER if symptoms worsen      Chief Complaint     Chief Complaint   Patient presents with    Cough     x 1 day    COVID-19    Fever         History of Present Illness       Developed fever of 100 3, sore throat, cough, fatigue,  and headache starting yesterday  Works at [employer] so has had multiple positive contacts with covid  Denies difficulty breathing, shortness of breath  Review of Systems   Review of Systems   Constitutional: Positive for fatigue and fever  Negative for chills  HENT: Positive for sneezing and sore throat  Negative for congestion, ear discharge, ear pain, postnasal drip, rhinorrhea, sinus pressure and sinus pain  Respiratory: Positive for cough  Negative for chest tightness, shortness of breath and wheezing  Cardiovascular: Negative for chest pain  Musculoskeletal: Negative for arthralgias and myalgias  Neurological: Positive for headaches  Current Medications       Current Outpatient Medications:     gabapentin (NEURONTIN) 300 mg capsule, Take 1 capsule by mouth daily (Patient not taking: Reported on 9/24/2020), Disp: 20 capsule, Rfl: 0    methocarbamol (ROBAXIN) 500 mg tablet, Take 1 tablet by mouth 2 (two) times a day (Patient not taking: Reported on 9/24/2020), Disp: 20 tablet, Rfl: 0    naproxen (NAPROSYN) 500 mg tablet, Take 1 tablet by mouth 2 (two) times a day with meals (Patient not taking: Reported on 9/24/2020), Disp: 30 tablet, Rfl: 0    Current Allergies     Allergies as of 09/24/2020    (No Known Allergies)            The following portions of the patient's history were reviewed and updated as appropriate: allergies, current medications, past family history, past medical history, past social history, past surgical history and problem list      Past Medical History:   Diagnosis Date    Vitamin D deficiency        History reviewed  No pertinent surgical history  History reviewed  No pertinent family history  Medications have been verified          Objective   Pulse 90   Temp 99 3 °F (37 4 °C)   Resp 18   Ht 6' 3" (1 905 m)   Wt 79 4 kg (175 lb)   SpO2 99%   BMI 21 87 kg/m²        Physical Exam     Physical Exam  Constitutional:       General: He is not in acute distress  Appearance: Normal appearance  He is not ill-appearing or diaphoretic  HENT:      Right Ear: Tympanic membrane, ear canal and external ear normal       Left Ear: Tympanic membrane, ear canal and external ear normal       Nose: Congestion present  Mouth/Throat:      Mouth: Mucous membranes are moist       Pharynx: Oropharynx is clear  Posterior oropharyngeal erythema present  Eyes:      Conjunctiva/sclera: Conjunctivae normal    Cardiovascular:      Rate and Rhythm: Normal rate and regular rhythm  Heart sounds: Normal heart sounds  Pulmonary:      Effort: Pulmonary effort is normal       Breath sounds: Normal breath sounds  Skin:     General: Skin is warm and dry  Neurological:      Mental Status: He is alert     Psychiatric:         Mood and Affect: Mood normal          Behavior: Behavior normal

## 2020-09-24 NOTE — PATIENT INSTRUCTIONS
Novel Coronavirus   WHAT YOU NEED TO KNOW:   What is the novel coronavirus (nCoV)? The nCoV is a new strain (type) of coronavirus  Coronaviruses often cause mild respiratory (lung) infections, such as the common cold  They can also cause more severe infections  Examples include acute respiratory syndrome (SARS), Middle East respiratory syndrome (MERS), pneumonia, and bronchitis  The nCoV can cause more severe symptoms than earlier coronaviruses  The nCoV was first found in individuals in part [facility] at the end of 2019  The virus is spreading as people who are infected travel to other parts of the world  What are the signs and symptoms of nCoV? Signs and symptoms can take 2 to 14 days to develop and range from mild to severe:  · Fever    · Cough    · Shortness of breath or trouble breathing    · Pneumonia (in severe cases)    How does nCoV spread? It is not known for sure how the virus spreads  The virus may spread in droplets when an infected person coughs or sneezes  Others become infected by breathing in the virus or getting the virus in their eyes  The virus can also possibly spread if a person touches certain animals, such as in a live market  How is nCoV diagnosed? If you develop symptoms of nCoV, you may need to be checked  Call your doctor if you traveled within the past 14 days to an area where nCoV is active  Call your doctor if you have close contact with someone else who did  Your doctor will tell you what to do  He or she will need to take precautions in the office to protect staff members and other patients  Your doctor will take samples from your airway  The samples have to be sent to the Centers for Disease Control and Prevention (CDC) to be tested  What should I do if I have nCoV? No treatment is available for nCoV  Medicine may be given to help relieve your cough or fever, or to help you breathe more easily   Severe nCoV may need to be treated in the hospital  In the hospital, you may need breathing treatment or support, such as extra oxygen  The following can help you prevent spreading nCoV to others  Have anyone you are caring for who has nCoV also use the guidelines  · Limit close contact with others  Stay in a different room, or sleep in a separate bed  Remind others to stay at least 6 feet (2 meters) away from you while you are sick  Only go out of your house if you are going to a medical appointment  While you are recovering, always call the office of any healthcare provider you seeing  The provider will need to prepare for your arrival to keep others safe  · Wear a medical mask when you are around others  A medical mask will help prevent you from spreading the virus  You can ask others around you to wear a medical mask if you are not able to wear one  · Cover your mouth and nose to sneeze or cough  Sneeze and cough into a tissue that covers your mouth and nose  Use the bend of our arm if you do not have a tissue  Throw the tissue away in a trash can right away  Then wash your hands well with soap and water  Use hand  that contains alcohol if you cannot wash your hands  · Wash your hands often  You and everyone in your home must wash your hands throughout the day  Use soap and water  Use germ-killing gel if soap and water are not available  Do not touch your eyes or mouth if you have not washed your hands  · Do not share items with other people  Do not share dishes, towels, or other items with anyone  Always wash items after you use them  · Clean frequently touched surfaces often  Use household  or bleach diluted with water to clean counters, doorknobs, toilet seats, and other surfaces  · Do not handle live animals  You may be able to spread nCoV to animals, including pets  Until more is known, it is best not to touch, play with, or handle live animals while you are sick  What can I do to relieve my symptoms?   The following may help if you have mild symptoms:  · Drink more liquids as directed  Liquids will help thin and loosen mucus so you can cough it up  Liquids will also help prevent dehydration  Liquids that help prevent dehydration include water, fruit juice, and broth  Do not drink liquids that contain caffeine  Caffeine can increase your risk for dehydration  Ask your healthcare provider how much liquid to drink each day  · Soothe a sore throat  Gargle with warm salt water  This helps your sore throat feel better  Make salt water by dissolving ¼ teaspoon salt in 1 cup warm water  You may also suck on hard candy or throat lozenges  You may use a sore throat spray  · Use a humidifier or vaporizer  Use a cool mist humidifier or a vaporizer to increase air moisture in your home  This may make it easier for you to breathe and help decrease your cough  · Use saline nasal drops as directed  These help relieve congestion  · Apply petroleum-based jelly around the outside of your nostrils  This can decrease irritation from blowing your nose  · Do not smoke  Nicotine and other chemicals in cigarettes and cigars can make your symptoms worse  They can also cause infections such as bronchitis or pneumonia  Ask your healthcare provider for information if you currently smoke and need help to quit  E-cigarettes or smokeless tobacco still contain nicotine  Talk to your healthcare provider before you use these products  Call your local emergency number [phone] in the [address] [facility] for any of the following:  Tell the  you may have nCoV  This will help anyone in the ambulance stay safe, and to call ahead to the hospital   · You have sudden shortness of breath  · You have a fast heartbeat or chest pain  When should I seek immediate care? · You feel so dizzy that you have trouble standing up  · Your lips and fingernails turn blue  When should I call my doctor? · You have a fever over 102ºF (39ºC)      · Your sore throat gets worse or you see white or yellow spots in your throat  · Your symptoms get worse after 3 to 5 days or your cold is not better in 14 days  · You have a rash anywhere on your skin  · You have large, tender lumps in your neck  · You have thick, green, or yellow drainage from your nose  · You cough up thick yellow, green, or bloody mucus  · You are vomiting for more than 24 hours and cannot keep fluids down  · You have a bad earache  · You have questions or concerns about your condition or care  CARE AGREEMENT:   You have the right to help plan your care  Learn about your health condition and how it may be treated  Discuss treatment options with your healthcare providers to decide what care you want to receive  You always have the right to refuse treatment  The above information is an  only  It is not intended as medical advice for individual conditions or treatments  Talk to your doctor, nurse or pharmacist before following any medical regimen to see if it is safe and effective for you  © Copyright [address] Information is for End User's use only and may not be sold, redistributed or otherwise used for commercial purposes   All illustrations and images included in CareNotes® are the copyrighted property of A D A M , [employer]  or [address] [place] [address]

## 2020-09-26 LAB — SARS-COV-2 RNA SPEC QL NAA+PROBE: NOT DETECTED

## 2020-11-02 ENCOUNTER — APPOINTMENT (EMERGENCY)
Dept: RADIOLOGY | Facility: HOSPITAL | Age: 25
End: 2020-11-02
Payer: OTHER MISCELLANEOUS

## 2020-11-02 ENCOUNTER — HOSPITAL ENCOUNTER (EMERGENCY)
Facility: HOSPITAL | Age: 25
Discharge: HOME | End: 2020-11-02
Attending: EMERGENCY MEDICINE
Payer: OTHER MISCELLANEOUS

## 2020-11-02 VITALS
OXYGEN SATURATION: 99 % | WEIGHT: 179.23 LBS | TEMPERATURE: 96.8 F | BODY MASS INDEX: 22.4 KG/M2 | DIASTOLIC BLOOD PRESSURE: 87 MMHG | SYSTOLIC BLOOD PRESSURE: 149 MMHG | RESPIRATION RATE: 18 BRPM | HEART RATE: 118 BPM

## 2020-11-02 DIAGNOSIS — M79.645 FINGER PAIN, LEFT: ICD-10-CM

## 2020-11-02 DIAGNOSIS — S63.289A DISLOCATION OF FINGER PIP JOINT, INITIAL ENCOUNTER: Primary | ICD-10-CM

## 2020-11-02 PROCEDURE — 99284 EMERGENCY DEPT VISIT MOD MDM: CPT | Performed by: EMERGENCY MEDICINE

## 2020-11-02 PROCEDURE — 26725 TREAT FINGER FRACTURE EACH: CPT | Performed by: EMERGENCY MEDICINE

## 2020-11-02 PROCEDURE — 73140 X-RAY EXAM OF FINGER(S): CPT

## 2020-11-02 PROCEDURE — 99283 EMERGENCY DEPT VISIT LOW MDM: CPT

## 2020-11-02 RX ORDER — LIDOCAINE HYDROCHLORIDE 10 MG/ML
5 INJECTION, SOLUTION EPIDURAL; INFILTRATION; INTRACAUDAL; PERINEURAL ONCE
Status: COMPLETED | OUTPATIENT
Start: 2020-11-02 | End: 2020-11-02

## 2020-11-02 RX ORDER — ACETAMINOPHEN 325 MG/1
975 TABLET ORAL ONCE
Status: COMPLETED | OUTPATIENT
Start: 2020-11-02 | End: 2020-11-02

## 2020-11-02 RX ORDER — IBUPROFEN 400 MG/1
400 TABLET ORAL ONCE
Status: COMPLETED | OUTPATIENT
Start: 2020-11-02 | End: 2020-11-02

## 2020-11-02 RX ADMIN — IBUPROFEN 400 MG: 400 TABLET ORAL at 04:13

## 2020-11-02 RX ADMIN — LIDOCAINE HYDROCHLORIDE 5 ML: 10 INJECTION, SOLUTION EPIDURAL; INFILTRATION; INTRACAUDAL; PERINEURAL at 03:00

## 2020-11-02 RX ADMIN — ACETAMINOPHEN 975 MG: 325 TABLET, FILM COATED ORAL at 04:13

## 2020-11-04 ENCOUNTER — OFFICE VISIT (OUTPATIENT)
Dept: OBGYN CLINIC | Facility: CLINIC | Age: 25
End: 2020-11-04
Payer: OTHER MISCELLANEOUS

## 2020-11-04 VITALS
TEMPERATURE: 97.2 F | SYSTOLIC BLOOD PRESSURE: 143 MMHG | BODY MASS INDEX: 22.26 KG/M2 | WEIGHT: 179 LBS | HEART RATE: 114 BPM | DIASTOLIC BLOOD PRESSURE: 81 MMHG | HEIGHT: 75 IN

## 2020-11-04 DIAGNOSIS — M20.032 SWAN-NECK DEFORMITY OF FINGER OF LEFT HAND: ICD-10-CM

## 2020-11-04 DIAGNOSIS — M65.9 SYNOVITIS OF FINGER: Primary | ICD-10-CM

## 2020-11-04 PROCEDURE — 99203 OFFICE O/P NEW LOW 30 MIN: CPT | Performed by: ORTHOPAEDIC SURGERY

## 2020-11-06 ENCOUNTER — EVALUATION (OUTPATIENT)
Dept: OCCUPATIONAL THERAPY | Facility: CLINIC | Age: 25
End: 2020-11-06
Payer: OTHER MISCELLANEOUS

## 2020-11-06 ENCOUNTER — OFFICE VISIT (OUTPATIENT)
Dept: OBGYN CLINIC | Facility: CLINIC | Age: 25
End: 2020-11-06
Payer: OTHER MISCELLANEOUS

## 2020-11-06 VITALS
SYSTOLIC BLOOD PRESSURE: 146 MMHG | WEIGHT: 179 LBS | DIASTOLIC BLOOD PRESSURE: 81 MMHG | HEART RATE: 106 BPM | TEMPERATURE: 99.1 F | HEIGHT: 75 IN | BODY MASS INDEX: 22.26 KG/M2

## 2020-11-06 DIAGNOSIS — M20.032 SWAN-NECK DEFORMITY OF FINGER OF LEFT HAND: ICD-10-CM

## 2020-11-06 DIAGNOSIS — M20.032 SWAN-NECK DEFORMITY OF FINGER OF LEFT HAND: Primary | ICD-10-CM

## 2020-11-06 PROCEDURE — 99213 OFFICE O/P EST LOW 20 MIN: CPT | Performed by: ORTHOPAEDIC SURGERY

## 2020-11-06 PROCEDURE — L3933 FO W/O JOINTS CF: HCPCS

## 2020-11-25 ENCOUNTER — TELEPHONE (OUTPATIENT)
Dept: PAIN MEDICINE | Facility: CLINIC | Age: 25
End: 2020-11-25

## 2020-11-30 ENCOUNTER — TELEPHONE (OUTPATIENT)
Dept: PAIN MEDICINE | Facility: CLINIC | Age: 25
End: 2020-11-30

## 2020-12-10 ENCOUNTER — TELEPHONE (OUTPATIENT)
Dept: OBGYN CLINIC | Facility: HOSPITAL | Age: 25
End: 2020-12-10

## 2020-12-14 ENCOUNTER — OFFICE VISIT (OUTPATIENT)
Dept: OBGYN CLINIC | Facility: MEDICAL CENTER | Age: 25
End: 2020-12-14
Payer: OTHER MISCELLANEOUS

## 2020-12-14 VITALS
HEIGHT: 68 IN | SYSTOLIC BLOOD PRESSURE: 146 MMHG | BODY MASS INDEX: 27.13 KG/M2 | DIASTOLIC BLOOD PRESSURE: 86 MMHG | HEART RATE: 108 BPM | WEIGHT: 179 LBS

## 2020-12-14 DIAGNOSIS — M20.032 SWAN-NECK DEFORMITY OF FINGER OF LEFT HAND: Primary | ICD-10-CM

## 2020-12-14 PROCEDURE — 99213 OFFICE O/P EST LOW 20 MIN: CPT | Performed by: ORTHOPAEDIC SURGERY

## 2020-12-22 ENCOUNTER — TELEPHONE (OUTPATIENT)
Dept: OBGYN CLINIC | Facility: HOSPITAL | Age: 25
End: 2020-12-22

## 2021-01-25 ENCOUNTER — OFFICE VISIT (OUTPATIENT)
Dept: OBGYN CLINIC | Facility: MEDICAL CENTER | Age: 26
End: 2021-01-25
Payer: OTHER MISCELLANEOUS

## 2021-01-25 VITALS
BODY MASS INDEX: 27.13 KG/M2 | HEIGHT: 68 IN | DIASTOLIC BLOOD PRESSURE: 80 MMHG | WEIGHT: 179 LBS | SYSTOLIC BLOOD PRESSURE: 135 MMHG | HEART RATE: 85 BPM

## 2021-01-25 DIAGNOSIS — M20.032 SWAN-NECK DEFORMITY OF FINGER OF LEFT HAND: Primary | ICD-10-CM

## 2021-01-25 PROCEDURE — 99213 OFFICE O/P EST LOW 20 MIN: CPT | Performed by: ORTHOPAEDIC SURGERY

## 2021-01-25 NOTE — LETTER
January 25, 2021     Patient: Nessa Vincent   YOB: 1995   Date of Visit: 1/25/2021       To Whom it May Concern:    Nessa Vincent is under my professional care  He was seen in my office on 1/25/2021  He may return to work on 1/26/2021  He can return to work without restrictions with the use of the figure eight splint  He may then discontinue once comfortable and work without the splint  He will follow up with us as needed  If you have any questions or concerns, please don't hesitate to call           Sincerely,          Jovanna Miller MD        CC: No Recipients

## 2021-01-25 NOTE — PROGRESS NOTES
CHIEF COMPLAINT:  Chief Complaint   Patient presents with    Left Little Finger - Follow-up       SUBJECTIVE:  Rae Nieves is a 23y o  year old male who presents left small finger volar plate tear at PIP joint with swan-neck deformity  Patient's initial injury occurred on 11/06/2020 while at work  Patient has been wearing his figure-eight splint  He has been out of work for the last 6 weeks  He states that he has no pain or discomfort at this time  PAST MEDICAL HISTORY:  Past Medical History:   Diagnosis Date    Vitamin D deficiency        PAST SURGICAL HISTORY:  History reviewed  No pertinent surgical history  FAMILY HISTORY:  History reviewed  No pertinent family history      SOCIAL HISTORY:  Social History     Tobacco Use    Smoking status: Never Smoker    Smokeless tobacco: Never Used   Substance Use Topics    Alcohol use: No    Drug use: No       MEDICATIONS:    Current Outpatient Medications:     Acetaminophen (TYLENOL PO), Take by mouth, Disp: , Rfl:     ALLERGIES:  No Known Allergies    REVIEW OF SYSTEMS:  Review of Systems  ROS:   General: no fever, no chills  HEENT:  No loss of hearing or eyesight problems  Eyes:  No red eyes  Respiratory:  No coughing, shortness of breath or wheezing  Cardiovascular:  No chest pain, no palpitations  GI:  Abdomen soft nontender, denies nausea  Endocrine:  No muscle weakness, no frequent urination, no excessive thirst  Urinary:  No dysuria, no incontinence  Musculoskeletal: see HPI and PE  SKIN:  No skin rash, no dry skin  Neurological:  No headaches, no confusion  Psychiatric:  No suicide thoughts, no anxiety, no depression  Review of all other systems is negative  VITALS:  Vitals:    01/25/21 1301   BP: 135/80   Pulse: 85       LABS:  HgA1c: No results found for: HGBA1C  BMP:   Lab Results   Component Value Date    CALCIUM 9 1 04/02/2017    K 3 9 04/02/2017    CO2 27 04/02/2017    CL 98 (L) 04/02/2017    BUN 12 04/02/2017    CREATININE 1 14 04/02/2017       _____________________________________________________  PHYSICAL EXAMINATION:  General: well developed and well nourished, alert, oriented times 3 and appears comfortable  Psychiatric: Normal  HEENT: Trachea Midline, No torticollis  Pulmonary: No audible wheezing or strider  Cardiovascular: No discernable arrhythmia   Skin: No masses, erythema, lacerations, fluctation, ulcerations  Neurovascular: Sensation Intact to the Median, Ulnar, Radial Nerve, Motor Intact to the Median, Ulnar, Radial Nerve and Pulses Intact    MUSCULOSKELETAL EXAMINATION:  Left small finger  No erythema edema or ecchymosis noted, skin is warm to touch  Minimal tenderness to palpation over the PIP joint  He has full flexion and extension of the digit  Patient is neurovascularly intact    ___________________________________________________  STUDIES REVIEWED:  No studies reviewed  PROCEDURES PERFORMED:  Procedures  No Procedures performed today    _____________________________________________________  ASSESSMENT/PLAN:    Left hand small finger volar plate tear at PIP joint with swan-neck deformity  - patient was advised that he can continue the use of the of the splint while at work  He may discontinue this at his leisure when he is comfortable  - was advised to continue to work on range of motion strengthening on his own  - he was given a work note that he may return to work without restrictions  - he will follow-up with us as needed    Follow Up:  Return if symptoms worsen or fail to improve  Work/school status:  No restrictions    To Do Next Visit:  Re-evaluation of current issue      Scribe Attestation    I,:  Martin Chiang PA-C am acting as a scribe while in the presence of the attending physician :       I,:  Tuyet Hernandez MD personally performed the services described in this documentation    as scribed in my presence :           Portions of the record may have been created with voice recognition software  Occasional wrong word or "sound a like" substitutions may have occurred due to the inherent limitations of voice recognition software  Read the chart carefully and recognize, using context, where substitutions have occurred

## 2021-01-27 DIAGNOSIS — Z23 ENCOUNTER FOR IMMUNIZATION: ICD-10-CM

## 2021-02-05 ENCOUNTER — IMMUNIZATIONS (OUTPATIENT)
Dept: FAMILY MEDICINE CLINIC | Facility: HOSPITAL | Age: 26
End: 2021-02-05

## 2021-02-05 DIAGNOSIS — Z23 ENCOUNTER FOR IMMUNIZATION: Primary | ICD-10-CM

## 2021-03-05 ENCOUNTER — IMMUNIZATIONS (OUTPATIENT)
Dept: FAMILY MEDICINE CLINIC | Facility: HOSPITAL | Age: 26
End: 2021-03-05

## 2021-03-05 DIAGNOSIS — Z23 ENCOUNTER FOR IMMUNIZATION: Primary | ICD-10-CM

## 2021-03-05 PROCEDURE — 0012A SARS-COV-2 / COVID-19 MRNA VACCINE (MODERNA) 100 MCG: CPT

## 2021-03-05 PROCEDURE — 91301 SARS-COV-2 / COVID-19 MRNA VACCINE (MODERNA) 100 MCG: CPT

## 2021-04-28 ENCOUNTER — LAB REQUISITION (OUTPATIENT)
Dept: LAB | Facility: HOSPITAL | Age: 26
End: 2021-04-28
Attending: INTERNAL MEDICINE
Payer: COMMERCIAL

## 2021-04-28 DIAGNOSIS — Z00.8 ENCOUNTER FOR OTHER GENERAL EXAMINATION: ICD-10-CM

## 2021-04-28 PROCEDURE — 86706 HEP B SURFACE ANTIBODY: CPT | Performed by: INTERNAL MEDICINE

## 2021-04-28 PROCEDURE — 86480 TB TEST CELL IMMUN MEASURE: CPT | Performed by: INTERNAL MEDICINE

## 2021-04-29 LAB — HBV SURFACE AB SER QL: NORMAL

## 2021-04-30 LAB
M TB IFN-G BLD-IMP: NEGATIVE
MITOGEN-NIL: >10 IU/ML
NIL: 0.02 IU/ML
TB AG-NIL: 0 IU/ML
TB2 AG - NIL: 0 IU/ML

## 2021-10-22 DIAGNOSIS — R50.9 FEVER IN ADULT: ICD-10-CM

## 2021-10-22 DIAGNOSIS — R43.0 LOSS OF SMELL: ICD-10-CM

## 2021-10-22 DIAGNOSIS — J32.9 SINUSITIS, UNSPECIFIED CHRONICITY, UNSPECIFIED LOCATION: Primary | ICD-10-CM

## 2021-10-22 DIAGNOSIS — Z20.9 EXPOSURE TO INFECTIOUS PATIENT IN CLOSED ENVIRONMENT FOR EXTENDED PERIOD: ICD-10-CM

## 2021-10-22 DIAGNOSIS — R43.2 LOSS OF TASTE: ICD-10-CM

## 2021-10-22 PROCEDURE — U0005 INFEC AGEN DETEC AMPLI PROBE: HCPCS | Performed by: FAMILY MEDICINE

## 2021-10-22 PROCEDURE — U0003 INFECTIOUS AGENT DETECTION BY NUCLEIC ACID (DNA OR RNA); SEVERE ACUTE RESPIRATORY SYNDROME CORONAVIRUS 2 (SARS-COV-2) (CORONAVIRUS DISEASE [COVID-19]), AMPLIFIED PROBE TECHNIQUE, MAKING USE OF HIGH THROUGHPUT TECHNOLOGIES AS DESCRIBED BY CMS-2020-01-R: HCPCS | Performed by: FAMILY MEDICINE

## 2021-12-27 ENCOUNTER — OFFICE VISIT (OUTPATIENT)
Dept: FAMILY MEDICINE CLINIC | Facility: CLINIC | Age: 26
End: 2021-12-27
Payer: COMMERCIAL

## 2021-12-27 VITALS
TEMPERATURE: 98.9 F | SYSTOLIC BLOOD PRESSURE: 132 MMHG | DIASTOLIC BLOOD PRESSURE: 84 MMHG | OXYGEN SATURATION: 98 % | HEART RATE: 75 BPM | WEIGHT: 183.2 LBS | BODY MASS INDEX: 28.27 KG/M2 | RESPIRATION RATE: 16 BRPM

## 2021-12-27 DIAGNOSIS — R68.84 JAW PAIN: Primary | ICD-10-CM

## 2021-12-27 DIAGNOSIS — K11.9 SALIVARY GLAND DISORDER: ICD-10-CM

## 2021-12-27 PROCEDURE — 99213 OFFICE O/P EST LOW 20 MIN: CPT | Performed by: FAMILY MEDICINE

## 2021-12-27 PROCEDURE — 1036F TOBACCO NON-USER: CPT | Performed by: FAMILY MEDICINE

## 2021-12-27 PROCEDURE — 3725F SCREEN DEPRESSION PERFORMED: CPT | Performed by: FAMILY MEDICINE

## 2021-12-27 RX ORDER — AMOXICILLIN 500 MG/1
500 CAPSULE ORAL EVERY 8 HOURS SCHEDULED
Qty: 30 CAPSULE | Refills: 0 | Status: SHIPPED | OUTPATIENT
Start: 2021-12-27 | End: 2022-01-06

## 2021-12-27 RX ORDER — METHYLPREDNISOLONE 4 MG/1
TABLET ORAL
Qty: 21 EACH | Refills: 0 | Status: SHIPPED | OUTPATIENT
Start: 2021-12-27

## 2022-02-24 ENCOUNTER — OFFICE VISIT (OUTPATIENT)
Dept: FAMILY MEDICINE CLINIC | Facility: CLINIC | Age: 27
End: 2022-02-24
Payer: COMMERCIAL

## 2022-02-24 VITALS
SYSTOLIC BLOOD PRESSURE: 126 MMHG | WEIGHT: 182 LBS | RESPIRATION RATE: 20 BRPM | DIASTOLIC BLOOD PRESSURE: 76 MMHG | TEMPERATURE: 100.3 F | HEART RATE: 84 BPM | BODY MASS INDEX: 23.36 KG/M2 | HEIGHT: 74 IN

## 2022-02-24 DIAGNOSIS — B34.9 VIRAL SYNDROME: ICD-10-CM

## 2022-02-24 DIAGNOSIS — J40 BRONCHITIS: Primary | ICD-10-CM

## 2022-02-24 PROCEDURE — 3008F BODY MASS INDEX DOCD: CPT | Performed by: FAMILY MEDICINE

## 2022-02-24 PROCEDURE — 1036F TOBACCO NON-USER: CPT | Performed by: FAMILY MEDICINE

## 2022-02-24 PROCEDURE — 99213 OFFICE O/P EST LOW 20 MIN: CPT | Performed by: FAMILY MEDICINE

## 2022-02-24 RX ORDER — DEXTROMETHORPHAN HYDROBROMIDE AND PROMETHAZINE HYDROCHLORIDE 15; 6.25 MG/5ML; MG/5ML
5 SOLUTION ORAL 4 TIMES DAILY PRN
Qty: 180 ML | Refills: 1 | Status: SHIPPED | OUTPATIENT
Start: 2022-02-24

## 2022-02-24 RX ORDER — METHYLPREDNISOLONE 4 MG/1
TABLET ORAL
Qty: 21 EACH | Refills: 0 | Status: SHIPPED | OUTPATIENT
Start: 2022-02-24

## 2022-02-24 RX ORDER — AZITHROMYCIN 250 MG/1
TABLET, FILM COATED ORAL
Qty: 6 TABLET | Refills: 0 | Status: SHIPPED | OUTPATIENT
Start: 2022-02-24 | End: 2022-03-01

## 2022-02-24 NOTE — PROGRESS NOTES
Assessment/Plan:  Bronchitis plan will be a Medrol Dosepak and Z-Ravi  Consider viral syndrome or influenza    The patient was advised to keep hydrated to rest and consider urgent care our office or the emergency room if there is worsening  The patient was advised that testing has been ordered    Problem List Items Addressed This Visit     None           There are no diagnoses linked to this encounter  No problem-specific Assessment & Plan notes found for this encounter  PHQ-2/9 Depression Screening            Body mass index is 23 37 kg/m²  BMI Counseling: Body mass index is 23 37 kg/m²  The BMI   Subjective:      Patient ID: Julio Birmingham is a 27 y o  male  Patient presents with a chief complaint of neck and back pain with headache and cough productive of yellow phlegm which generalized achiness times 24 hours      The following portions of the patient's history were reviewed and updated as appropriate:   He has a past medical history of Vitamin D deficiency  ,  does not have a problem list on file  ,   has no past surgical history on file  ,  family history is not on file  ,   reports that he has never smoked  His smokeless tobacco use includes chew  He reports current alcohol use  He reports that he does not use drugs  ,  has No Known Allergies     Current Outpatient Medications   Medication Sig Dispense Refill    Acetaminophen (TYLENOL PO) Take by mouth (Patient not taking: Reported on 2/24/2022 )      methylPREDNISolone 4 MG tablet therapy pack Use as directed on package (Patient not taking: Reported on 2/24/2022 ) 21 each 0     No current facility-administered medications for this visit  Review of Systems   Constitutional: Positive for fatigue and fever  Negative for chills  HENT: Negative for ear pain and sore throat  Eyes: Negative for pain and visual disturbance  Respiratory: Positive for cough  Negative for shortness of breath      Cardiovascular: Negative for chest pain and palpitations  Gastrointestinal: Negative for abdominal pain and vomiting  Genitourinary: Negative for dysuria and hematuria  Musculoskeletal: Positive for arthralgias, back pain and myalgias  Skin: Negative for color change and rash  Neurological: Negative for seizures and syncope  All other systems reviewed and are negative  Objective:    /76   Pulse 84   Temp 100 3 °F (37 9 °C)   Resp 20   Ht 6' 2" (1 88 m)   Wt 82 6 kg (182 lb)   BMI 23 37 kg/m²   Body mass index is 23 37 kg/m²  Physical Exam  Constitutional:       Appearance: He is well-developed  HENT:      Head: Normocephalic  Eyes:      Pupils: Pupils are equal, round, and reactive to light  Cardiovascular:      Rate and Rhythm: Normal rate and regular rhythm  Heart sounds: Normal heart sounds  Pulmonary:      Effort: Pulmonary effort is normal       Breath sounds: Normal breath sounds  Abdominal:      General: Bowel sounds are normal       Palpations: Abdomen is soft  Tenderness: There is no abdominal tenderness  Musculoskeletal:      Cervical back: Normal range of motion  Comments: Lumbar paraspinal spasm   Skin:     General: Skin is warm  Neurological:      Mental Status: He is alert and oriented to person, place, and time

## 2022-05-09 ENCOUNTER — OFFICE VISIT (OUTPATIENT)
Dept: FAMILY MEDICINE CLINIC | Facility: CLINIC | Age: 27
End: 2022-05-09
Payer: COMMERCIAL

## 2022-05-09 VITALS
BODY MASS INDEX: 22.72 KG/M2 | HEART RATE: 76 BPM | TEMPERATURE: 97.8 F | RESPIRATION RATE: 16 BRPM | SYSTOLIC BLOOD PRESSURE: 140 MMHG | HEIGHT: 74 IN | DIASTOLIC BLOOD PRESSURE: 68 MMHG | WEIGHT: 177 LBS

## 2022-05-09 DIAGNOSIS — J06.9 VIRAL UPPER RESPIRATORY TRACT INFECTION: Primary | ICD-10-CM

## 2022-05-09 DIAGNOSIS — J31.0 CHRONIC RHINITIS: ICD-10-CM

## 2022-05-09 DIAGNOSIS — R04.0 EPISTAXIS: ICD-10-CM

## 2022-05-09 PROCEDURE — 99213 OFFICE O/P EST LOW 20 MIN: CPT | Performed by: FAMILY MEDICINE

## 2022-05-09 PROCEDURE — 1036F TOBACCO NON-USER: CPT | Performed by: FAMILY MEDICINE

## 2022-05-09 PROCEDURE — 3008F BODY MASS INDEX DOCD: CPT | Performed by: FAMILY MEDICINE

## 2022-05-09 NOTE — PROGRESS NOTES
Assessment/Plan:  Patient had a viral upper respiratory infection that has resolved he was of of work from May 3rd through May 6th  Patient complains of dry nose with multiple episodes of bleeding  Advised the patient to try Vaseline the patient stated he is allergic to Vaseline  He has been using Flonase however to has been ineffective  Will refer to ENT for evaluation    Problem List Items Addressed This Visit     None           There are no diagnoses linked to this encounter  No problem-specific Assessment & Plan notes found for this encounter  PHQ-2/9 Depression Screening            Body mass index is 22 73 kg/m²  BMI Counseling: Body mass index is 22 73 kg/m²  The BMI   Subjective:      Patient ID: Marilyn Rivera is a 27 y o  male  Patient presents with a chief complaint of having a cough and nasal congestion last week causing him to miss work May 3rd 4th and 5th  Patient states he continues with a dry nose and episodic nosebleeds  The following portions of the patient's history were reviewed and updated as appropriate:   He has a past medical history of Vitamin D deficiency  ,  does not have a problem list on file  ,   has no past surgical history on file  ,  family history is not on file  ,   reports that he has never smoked  His smokeless tobacco use includes chew  He reports current alcohol use  He reports that he does not use drugs  ,  is allergic to vaseline [petrolatum]     Current Outpatient Medications   Medication Sig Dispense Refill    Acetaminophen (TYLENOL PO) Take by mouth (Patient not taking: Reported on 2/24/2022 )      methylPREDNISolone 4 MG tablet therapy pack Use as directed on package (Patient not taking: Reported on 2/24/2022 ) 21 each 0    methylPREDNISolone 4 MG tablet therapy pack Use as directed on package (Patient not taking: Reported on 5/9/2022 ) 21 each 0    Promethazine-DM (PHENERGAN-DM) 6 25-15 mg/5 mL oral syrup Take 5 mL by mouth 4 (four) times a day as needed for cough (Patient not taking: Reported on 5/9/2022 ) 180 mL 1     No current facility-administered medications for this visit  Review of Systems   Constitutional: Negative for chills and fever  HENT: Positive for nosebleeds  Negative for ear pain and sore throat  Dry nose   Eyes: Negative for pain and visual disturbance  Respiratory: Negative for cough and shortness of breath  Cardiovascular: Negative for chest pain and palpitations  Gastrointestinal: Negative for abdominal pain and vomiting  Genitourinary: Negative for dysuria and hematuria  Musculoskeletal: Negative for arthralgias and back pain  Skin: Negative for color change and rash  Neurological: Negative for seizures and syncope  All other systems reviewed and are negative  Objective:    /68   Pulse 76   Temp 97 8 °F (36 6 °C)   Resp 16   Ht 6' 2" (1 88 m)   Wt 80 3 kg (177 lb)   BMI 22 73 kg/m²   Body mass index is 22 73 kg/m²  Physical Exam  Constitutional:       Appearance: He is well-developed  HENT:      Head: Normocephalic  Eyes:      Pupils: Pupils are equal, round, and reactive to light  Cardiovascular:      Rate and Rhythm: Normal rate and regular rhythm  Heart sounds: Normal heart sounds  Pulmonary:      Effort: Pulmonary effort is normal       Breath sounds: Normal breath sounds  Abdominal:      General: Bowel sounds are normal       Palpations: Abdomen is soft  Tenderness: There is no abdominal tenderness  Musculoskeletal:      Cervical back: Normal range of motion  Skin:     General: Skin is warm  Neurological:      Mental Status: He is alert and oriented to person, place, and time

## 2022-05-10 ENCOUNTER — TELEPHONE (OUTPATIENT)
Dept: OTOLARYNGOLOGY | Facility: CLINIC | Age: 27
End: 2022-05-10

## 2022-05-16 ENCOUNTER — TELEPHONE (OUTPATIENT)
Dept: OTOLARYNGOLOGY | Facility: CLINIC | Age: 27
End: 2022-05-16

## 2022-06-06 DIAGNOSIS — R50.9 FEVER IN ADULT: ICD-10-CM

## 2022-06-06 DIAGNOSIS — R43.0 ABSENT SENSE OF SMELL: ICD-10-CM

## 2022-06-06 DIAGNOSIS — J32.9 SINUSITIS, UNSPECIFIED CHRONICITY, UNSPECIFIED LOCATION: Primary | ICD-10-CM

## 2022-06-06 DIAGNOSIS — R09.89 CHEST CONGESTION: ICD-10-CM

## 2022-06-06 DIAGNOSIS — J32.9 SINUSITIS, UNSPECIFIED CHRONICITY, UNSPECIFIED LOCATION: ICD-10-CM

## 2022-06-06 DIAGNOSIS — R43.2 TASTE ABSENT: ICD-10-CM

## 2022-06-06 PROCEDURE — U0005 INFEC AGEN DETEC AMPLI PROBE: HCPCS | Performed by: FAMILY MEDICINE

## 2022-06-06 PROCEDURE — U0003 INFECTIOUS AGENT DETECTION BY NUCLEIC ACID (DNA OR RNA); SEVERE ACUTE RESPIRATORY SYNDROME CORONAVIRUS 2 (SARS-COV-2) (CORONAVIRUS DISEASE [COVID-19]), AMPLIFIED PROBE TECHNIQUE, MAKING USE OF HIGH THROUGHPUT TECHNOLOGIES AS DESCRIBED BY CMS-2020-01-R: HCPCS | Performed by: FAMILY MEDICINE

## 2022-06-07 LAB — SARS-COV-2 RNA RESP QL NAA+PROBE: NEGATIVE

## 2022-08-18 ENCOUNTER — OFFICE VISIT (OUTPATIENT)
Dept: FAMILY MEDICINE CLINIC | Facility: CLINIC | Age: 27
End: 2022-08-18
Payer: COMMERCIAL

## 2022-08-18 VITALS
RESPIRATION RATE: 20 BRPM | TEMPERATURE: 97.8 F | BODY MASS INDEX: 21.94 KG/M2 | HEIGHT: 74 IN | SYSTOLIC BLOOD PRESSURE: 126 MMHG | WEIGHT: 171 LBS | HEART RATE: 84 BPM | DIASTOLIC BLOOD PRESSURE: 84 MMHG

## 2022-08-18 DIAGNOSIS — F51.01 PRIMARY INSOMNIA: Primary | ICD-10-CM

## 2022-08-18 PROCEDURE — 99213 OFFICE O/P EST LOW 20 MIN: CPT | Performed by: FAMILY MEDICINE

## 2022-08-18 PROCEDURE — 3725F SCREEN DEPRESSION PERFORMED: CPT | Performed by: FAMILY MEDICINE

## 2022-08-18 RX ORDER — ZOLPIDEM TARTRATE 10 MG/1
10 TABLET ORAL
Qty: 30 TABLET | Refills: 2 | Status: SHIPPED | OUTPATIENT
Start: 2022-08-18

## 2022-08-18 NOTE — PROGRESS NOTES
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   The patient will be prescribed Ambien to aid in sleep  Ambien is chosen because of its short half-life  The patient needs to be alert for his work  Problem List Items Addressed This Visit    None     Visit Diagnoses     Primary insomnia    -  Primary    Relevant Medications    zolpidem (AMBIEN) 10 mg tablet           Diagnoses and all orders for this visit:    Primary insomnia  -     zolpidem (AMBIEN) 10 mg tablet; Take 1 tablet (10 mg total) by mouth daily at bedtime as needed for sleep      No problem-specific Assessment & Plan notes found for this encounter  PHQ-2/9 Depression Screening    Little interest or pleasure in doing things: 0 - not at all  Feeling down, depressed, or hopeless: 0 - not at all  PHQ-2 Score: 0  PHQ-2 Interpretation: Negative depression screen          Body mass index is 21 96 kg/m²  BMI Counseling: Body mass index is 21 96 kg/m²  The BMI     Subjective:      Patient ID: Dilan Andrews is a 24 y o  male  Patient presents with a chief complaint of insomnia the patient works night shift and states that he is unable to sleep after work states that he has been up for the last 24 hours  The patient is concerned that the change in his schedule is contributing to his sleep depravation      The following portions of the patient's history were reviewed and updated as appropriate:   He has a past medical history of Vitamin D deficiency  ,  does not have a problem list on file  ,   has no past surgical history on file  ,  family history is not on file  ,   reports that he has never smoked  His smokeless tobacco use includes chew  He reports current alcohol use  He reports that he does not use drugs  ,  is allergic to vaseline [petrolatum]     Current Outpatient Medications   Medication Sig Dispense Refill    zolpidem (AMBIEN) 10 mg tablet Take 1 tablet (10 mg total) by mouth daily at bedtime as needed for sleep 30 tablet 2    Acetaminophen (TYLENOL PO) Take by mouth (Patient not taking: Reported on 2/24/2022 )      methylPREDNISolone 4 MG tablet therapy pack Use as directed on package (Patient not taking: Reported on 2/24/2022 ) 21 each 0    methylPREDNISolone 4 MG tablet therapy pack Use as directed on package (Patient not taking: Reported on 5/9/2022 ) 21 each 0    Promethazine-DM (PHENERGAN-DM) 6 25-15 mg/5 mL oral syrup Take 5 mL by mouth 4 (four) times a day as needed for cough (Patient not taking: Reported on 5/9/2022 ) 180 mL 1     No current facility-administered medications for this visit  Review of Systems   Constitutional: Negative for chills and fever  HENT: Negative for ear pain and sore throat  Eyes: Negative for pain and visual disturbance  Respiratory: Negative for cough and shortness of breath  Cardiovascular: Negative for chest pain and palpitations  Gastrointestinal: Negative for abdominal pain and vomiting  Genitourinary: Negative for dysuria and hematuria  Musculoskeletal: Negative for arthralgias and back pain  Skin: Negative for color change and rash  Neurological: Negative for seizures and syncope  Psychiatric/Behavioral:        Insomnia due to change in schedule   All other systems reviewed and are negative  Objective:    /84   Pulse 84   Temp 97 8 °F (36 6 °C)   Resp 20   Ht 6' 2" (1 88 m)   Wt 77 6 kg (171 lb)   BMI 21 96 kg/m²   Body mass index is 21 96 kg/m²  Physical Exam  Constitutional:       Appearance: He is well-developed  HENT:      Head: Normocephalic  Eyes:      Pupils: Pupils are equal, round, and reactive to light  Cardiovascular:      Rate and Rhythm: Normal rate and regular rhythm  Heart sounds: Normal heart sounds  Pulmonary:      Effort: Pulmonary effort is normal       Breath sounds: Normal breath sounds     Abdominal:      General: Bowel sounds are normal  Palpations: Abdomen is soft  Tenderness: There is no abdominal tenderness  Musculoskeletal:      Cervical back: Normal range of motion  Skin:     General: Skin is warm  Neurological:      Mental Status: He is alert and oriented to person, place, and time

## 2023-01-12 ENCOUNTER — OFFICE VISIT (OUTPATIENT)
Dept: FAMILY MEDICINE CLINIC | Facility: CLINIC | Age: 28
End: 2023-01-12

## 2023-01-12 VITALS
WEIGHT: 177 LBS | DIASTOLIC BLOOD PRESSURE: 94 MMHG | HEART RATE: 98 BPM | SYSTOLIC BLOOD PRESSURE: 142 MMHG | TEMPERATURE: 97.7 F | OXYGEN SATURATION: 98 % | BODY MASS INDEX: 22.73 KG/M2

## 2023-01-12 DIAGNOSIS — L02.91 ABSCESS: Primary | ICD-10-CM

## 2023-01-12 RX ORDER — CEPHALEXIN 500 MG/1
500 CAPSULE ORAL EVERY 6 HOURS SCHEDULED
Qty: 40 CAPSULE | Refills: 0 | Status: SHIPPED | OUTPATIENT
Start: 2023-01-12 | End: 2023-01-22

## 2023-01-12 NOTE — PROGRESS NOTES
Assessment/Plan: Abscess under left mandible will provide Keflex 500 4 times daily and refer to ENT for evaluation and treatment    Problem List Items Addressed This Visit    None  Visit Diagnoses     Abscess    -  Primary           Diagnoses and all orders for this visit:    Abscess        No problem-specific Assessment & Plan notes found for this encounter  PHQ-2/9 Depression Screening            Body mass index is 22 73 kg/m²  BMI Counseling: Body mass index is 22 73 kg/m²  The BMI     Subjective:      Patient ID: Amari Torres is a 24 y o  male  Painful swelling under the left mandible times approximately 2 weeks      The following portions of the patient's history were reviewed and updated as appropriate:   He has a past medical history of Vitamin D deficiency  ,  does not have a problem list on file  ,   has no past surgical history on file  ,  family history is not on file  ,   reports that he has never smoked  His smokeless tobacco use includes chew  He reports current alcohol use  He reports that he does not use drugs  ,  is allergic to vaseline [petrolatum]     Current Outpatient Medications   Medication Sig Dispense Refill   • zolpidem (AMBIEN) 10 mg tablet Take 1 tablet (10 mg total) by mouth daily at bedtime as needed for sleep 30 tablet 2   • Acetaminophen (TYLENOL PO) Take by mouth (Patient not taking: Reported on 2/24/2022 )     • methylPREDNISolone 4 MG tablet therapy pack Use as directed on package (Patient not taking: Reported on 2/24/2022 ) 21 each 0   • methylPREDNISolone 4 MG tablet therapy pack Use as directed on package (Patient not taking: Reported on 5/9/2022 ) 21 each 0   • Promethazine-DM (PHENERGAN-DM) 6 25-15 mg/5 mL oral syrup Take 5 mL by mouth 4 (four) times a day as needed for cough (Patient not taking: Reported on 5/9/2022 ) 180 mL 1     No current facility-administered medications for this visit  Review of Systems   Constitutional: Negative for chills and fever  HENT: Negative for ear pain and sore throat  Pain and swelling under the left mandible   Eyes: Negative for pain and visual disturbance  Respiratory: Negative for cough and shortness of breath  Cardiovascular: Negative for chest pain and palpitations  Gastrointestinal: Negative for abdominal pain and vomiting  Genitourinary: Negative for dysuria and hematuria  Musculoskeletal: Negative for arthralgias and back pain  Skin: Negative for color change and rash  Neurological: Negative for seizures and syncope  All other systems reviewed and are negative  Objective:    /94 (BP Location: Left arm, Patient Position: Sitting, Cuff Size: Standard)   Pulse 98   Temp 97 7 °F (36 5 °C) (Tympanic)   Wt 80 3 kg (177 lb)   SpO2 98%   BMI 22 73 kg/m²   Body mass index is 22 73 kg/m²  Physical Exam  Constitutional:       Appearance: He is well-developed  HENT:      Head: Normocephalic  Comments: Area of swelling  Eyes:      Pupils: Pupils are equal, round, and reactive to light  Cardiovascular:      Rate and Rhythm: Normal rate and regular rhythm  Heart sounds: Normal heart sounds  Pulmonary:      Effort: Pulmonary effort is normal       Breath sounds: Normal breath sounds  Abdominal:      General: Bowel sounds are normal       Palpations: Abdomen is soft  Tenderness: There is no abdominal tenderness  Musculoskeletal:      Cervical back: Normal range of motion  Skin:     General: Skin is warm  Neurological:      Mental Status: He is alert and oriented to person, place, and time

## 2023-01-13 ENCOUNTER — TELEPHONE (OUTPATIENT)
Dept: FAMILY MEDICINE CLINIC | Facility: CLINIC | Age: 28
End: 2023-01-13

## 2023-01-13 ENCOUNTER — HOSPITAL ENCOUNTER (EMERGENCY)
Facility: HOSPITAL | Age: 28
Discharge: HOME | End: 2023-01-13
Attending: EMERGENCY MEDICINE

## 2023-01-13 ENCOUNTER — APPOINTMENT (EMERGENCY)
Dept: CT IMAGING | Facility: HOSPITAL | Age: 28
End: 2023-01-13

## 2023-01-13 VITALS
WEIGHT: 185.19 LBS | HEART RATE: 95 BPM | OXYGEN SATURATION: 96 % | SYSTOLIC BLOOD PRESSURE: 152 MMHG | BODY MASS INDEX: 23.77 KG/M2 | DIASTOLIC BLOOD PRESSURE: 95 MMHG | HEIGHT: 74 IN | RESPIRATION RATE: 18 BRPM | TEMPERATURE: 98.1 F

## 2023-01-13 DIAGNOSIS — L02.91 SOFT TISSUE ABSCESS: ICD-10-CM

## 2023-01-13 DIAGNOSIS — L03.211 FACIAL CELLULITIS: Primary | ICD-10-CM

## 2023-01-13 LAB
ALBUMIN SERPL BCP-MCNC: 4.3 G/DL (ref 3.5–5)
ALP SERPL-CCNC: 104 U/L (ref 46–116)
ALT SERPL W P-5'-P-CCNC: 38 U/L (ref 12–78)
ANION GAP SERPL CALCULATED.3IONS-SCNC: 13 MMOL/L (ref 4–13)
AST SERPL W P-5'-P-CCNC: 31 U/L (ref 5–45)
BASOPHILS # BLD AUTO: 0.07 THOUSANDS/ÂΜL (ref 0–0.1)
BASOPHILS NFR BLD AUTO: 1 % (ref 0–1)
BILIRUB SERPL-MCNC: 0.56 MG/DL (ref 0.2–1)
BUN SERPL-MCNC: 12 MG/DL (ref 5–25)
CALCIUM SERPL-MCNC: 9.6 MG/DL (ref 8.3–10.1)
CHLORIDE SERPL-SCNC: 99 MMOL/L (ref 96–108)
CO2 SERPL-SCNC: 28 MMOL/L (ref 21–32)
CREAT SERPL-MCNC: 1.06 MG/DL (ref 0.6–1.3)
EOSINOPHIL # BLD AUTO: 0.2 THOUSAND/ÂΜL (ref 0–0.61)
EOSINOPHIL NFR BLD AUTO: 2 % (ref 0–6)
ERYTHROCYTE [DISTWIDTH] IN BLOOD BY AUTOMATED COUNT: 11.2 % (ref 11.6–15.1)
GFR SERPL CREATININE-BSD FRML MDRD: 97 ML/MIN/1.73SQ M
GLUCOSE SERPL-MCNC: 81 MG/DL (ref 65–140)
HCT VFR BLD AUTO: 44.4 % (ref 36.5–49.3)
HGB BLD-MCNC: 15.2 G/DL (ref 12–17)
IMM GRANULOCYTES # BLD AUTO: 0.02 THOUSAND/UL (ref 0–0.2)
IMM GRANULOCYTES NFR BLD AUTO: 0 % (ref 0–2)
LYMPHOCYTES # BLD AUTO: 1.61 THOUSANDS/ÂΜL (ref 0.6–4.47)
LYMPHOCYTES NFR BLD AUTO: 17 % (ref 14–44)
MCH RBC QN AUTO: 32.3 PG (ref 26.8–34.3)
MCHC RBC AUTO-ENTMCNC: 34.2 G/DL (ref 31.4–37.4)
MCV RBC AUTO: 94 FL (ref 82–98)
MONOCYTES # BLD AUTO: 0.81 THOUSAND/ÂΜL (ref 0.17–1.22)
MONOCYTES NFR BLD AUTO: 8 % (ref 4–12)
NEUTROPHILS # BLD AUTO: 6.97 THOUSANDS/ÂΜL (ref 1.85–7.62)
NEUTS SEG NFR BLD AUTO: 72 % (ref 43–75)
NRBC BLD AUTO-RTO: 0 /100 WBCS
PLATELET # BLD AUTO: 304 THOUSANDS/UL (ref 149–390)
PMV BLD AUTO: 8.5 FL (ref 8.9–12.7)
POTASSIUM SERPL-SCNC: 4.3 MMOL/L (ref 3.5–5.3)
PROT SERPL-MCNC: 8.1 G/DL (ref 6.4–8.4)
RBC # BLD AUTO: 4.71 MILLION/UL (ref 3.88–5.62)
SODIUM SERPL-SCNC: 140 MMOL/L (ref 135–147)
WBC # BLD AUTO: 9.68 THOUSAND/UL (ref 4.31–10.16)

## 2023-01-13 RX ORDER — CEFTRIAXONE 1 G/50ML
1000 INJECTION, SOLUTION INTRAVENOUS ONCE
Status: COMPLETED | OUTPATIENT
Start: 2023-01-13 | End: 2023-01-13

## 2023-01-13 RX ORDER — KETOROLAC TROMETHAMINE 30 MG/ML
15 INJECTION, SOLUTION INTRAMUSCULAR; INTRAVENOUS ONCE
Status: COMPLETED | OUTPATIENT
Start: 2023-01-13 | End: 2023-01-13

## 2023-01-13 RX ORDER — SULFAMETHOXAZOLE AND TRIMETHOPRIM 800; 160 MG/1; MG/1
1 TABLET ORAL ONCE
Status: COMPLETED | OUTPATIENT
Start: 2023-01-13 | End: 2023-01-13

## 2023-01-13 RX ORDER — SULFAMETHOXAZOLE AND TRIMETHOPRIM 800; 160 MG/1; MG/1
1 TABLET ORAL 2 TIMES DAILY
Qty: 20 TABLET | Refills: 0 | Status: SHIPPED | OUTPATIENT
Start: 2023-01-13 | End: 2023-01-23

## 2023-01-13 RX ADMIN — IOHEXOL 85 ML: 350 INJECTION, SOLUTION INTRAVENOUS at 16:15

## 2023-01-13 RX ADMIN — SULFAMETHOXAZOLE AND TRIMETHOPRIM 1 TABLET: 800; 160 TABLET ORAL at 17:54

## 2023-01-13 RX ADMIN — CEFTRIAXONE 1000 MG: 1 INJECTION, SOLUTION INTRAVENOUS at 17:55

## 2023-01-13 RX ADMIN — KETOROLAC TROMETHAMINE 15 MG: 30 INJECTION, SOLUTION INTRAMUSCULAR; INTRAVENOUS at 15:45

## 2023-01-13 NOTE — Clinical Note
Heidi Lora was seen and treated in our emergency department on 1/13/2023  No restrictions            Diagnosis:     Elyssa Dewey    He may return on this date:     Please excuse Heidi Lora from work on 01/13/2023  If you have any questions or concerns, please don't hesitate to call        Kellee Butler PA-C    ______________________________           _______________          _______________  [facility] Representative                              Date                                Time

## 2023-01-13 NOTE — TELEPHONE ENCOUNTER
Mom state that the gland/abscess has gotten bigger - has been using hot compresses and had 2-3 doses of the antibiotic     Anything else that he should be doing?

## 2023-01-13 NOTE — ED PROVIDER NOTES
History  Chief Complaint   Patient presents with   • Medical Problem     Patient has a lump on the left side of his neck for a few weeks  Patient was seen at his family doctor yesterday and was placed on keflex due to them thinking it was an abscess  Patient states when he woke up this morning the lump was twice the size and hes having a difficult time opening his mouth to eat  Pain 7/10 at this time     This is a 70-year-old male who comes in via personal vehicle and provides his history  Patient states he has a 2-week history of pain under the left mandible  He noticed a lump starting 2 days ago  He went to see his family doctor yesterday and he was diagnosed with an abscess, started on Keflex 4 times daily and do warm compresses  Patient had 3 doses as of this morning  When he woke up he noticed that the lump was twice in size  He notified his family doctor who told him to seek medical attention at the emergency room  Patient denies any trauma to the area, fevers, chills, ear pain, dysphagia, difficulty breathing, nausea or vomiting     It is difficult to open his mouth due to pain making it difficult to eat the past couple days, but denies a sore throat, altered taste, swollen tongue  Patient states he has not been to a dentist "for a while" due to work and noted problems with his lower back molars a few months ago  He has not had wisdom teeth removed  He denies smoking, but does use chewing tobacco, about a can and a half a day  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 Taking?    Acetaminophen (TYLENOL PO)   Yes No   Sig: Take by mouth   Patient not taking: Reported on 2/24/2022    Promethazine-DM (PHENERGAN-DM) 6 25-15 mg/5 mL oral syrup   No No   Sig: Take 5 mL by mouth 4 (four) times a day as needed for cough   Patient not taking: Reported on 5/9/2022    cephalexin (KEFLEX) 500 mg capsule   No No   Sig: Take 1 capsule (500 mg total) by mouth every 6 (six) hours for 10 days   methylPREDNISolone 4 MG tablet therapy pack   No No   Sig: Use as directed on package   Patient not taking: Reported on 2/24/2022    methylPREDNISolone 4 MG tablet therapy pack   No No   Sig: Use as directed on package   Patient not taking: Reported on 5/9/2022    zolpidem (AMBIEN) 10 mg tablet   No No   Sig: Take 1 tablet (10 mg total) by mouth daily at bedtime as needed for sleep      Facility-Administered Medications: None       Past Medical History:   Diagnosis Date   • Vitamin D deficiency        History reviewed  No pertinent surgical history  History reviewed  No pertinent family history  I have reviewed and agree with the history as documented  E-Cigarette/Vaping   • E-Cigarette Use Never User      E-Cigarette/Vaping Substances   • Nicotine No    • THC No    • CBD No    • Flavoring No    • Other No    • Unknown No      Social History     Tobacco Use   • Smoking status: Never   • Smokeless tobacco: Current     Types: Chew   Vaping Use   • Vaping Use: Never used   Substance Use Topics   • Alcohol use: Yes     Comment: occassional   • Drug use: No       Review of Systems   Constitutional: Negative for activity change, appetite change, chills and fever  HENT: Positive for dental problem and facial swelling  Negative for ear pain, rhinorrhea, sore throat, trouble swallowing and voice change  Eyes: Negative for pain  Respiratory: Negative for cough, choking, chest tightness and shortness of breath  Cardiovascular: Negative for chest pain  Gastrointestinal: Negative for nausea and vomiting     Musculoskeletal: Positive for neck pain (Left submandibular area)  Negative for neck stiffness  Neurological: Negative for dizziness, speech difficulty, numbness and headaches  Physical Exam  Physical Exam  Vitals reviewed  Constitutional:       Comments: Appears uncomfortable   HENT:      Head: Normocephalic and atraumatic  Jaw: Tenderness, swelling and pain on movement present  No trismus or malocclusion  Right Ear: Tympanic membrane, ear canal and external ear normal  There is no impacted cerumen  Left Ear: Tympanic membrane, ear canal and external ear normal  There is no impacted cerumen  Nose: No rhinorrhea  Mouth/Throat:      Lips: Pink  Mouth: Mucous membranes are moist  No oral lesions or angioedema  Dentition: Abnormal dentition  Dental tenderness and dental caries present  No gingival swelling, dental abscesses or gum lesions  Tongue: No lesions  Palate: No mass and lesions  Pharynx: No pharyngeal swelling, oropharyngeal exudate, posterior oropharyngeal erythema or uvula swelling  Tonsils: No tonsillar exudate or tonsillar abscesses  Comments: Buccal edema,   Neck:      Thyroid: No thyroid mass  Trachea: Trachea and phonation normal         Comments: Difficult to palpate lymphadenopathy of the left neck due to edema  Cardiovascular:      Rate and Rhythm: Normal rate and regular rhythm  Heart sounds: Normal heart sounds  Pulmonary:      Effort: Pulmonary effort is normal       Breath sounds: Normal breath sounds and air entry  Abdominal:      General: Abdomen is flat  Musculoskeletal:      Cervical back: No rigidity or crepitus  Pain with movement present  Decreased range of motion (Due to pain)  Lymphadenopathy:      Head:      Right side of head: No submental, submandibular, tonsillar, preauricular, posterior auricular or occipital adenopathy  Left side of head: No submental, preauricular, posterior auricular or occipital adenopathy        Cervical: Right cervical: No superficial, deep or posterior cervical adenopathy  Left cervical: No superficial, deep or posterior cervical adenopathy  Comments: Difficult to palpate for submandibular and tonsillar lymphadenopathy due to pain and edema   Skin:     General: Skin is warm and dry  Comments: Facial hair present  Unable to appreciate any erythema or open draining wounds  Neurological:      Mental Status: He is alert           Vital Signs  ED Triage Vitals   Temperature Pulse Respirations Blood Pressure SpO2   01/13/23 1413 01/13/23 1413 01/13/23 1413 01/13/23 1413 01/13/23 1413   98 1 °F (36 7 °C) 95 18 152/95 96 %      Temp Source Heart Rate Source Patient Position - Orthostatic VS BP Location FiO2 (%)   01/13/23 1413 01/13/23 1413 -- 01/13/23 1413 --   Temporal Monitor  Right arm       Pain Score       01/13/23 1545       7           Vitals:    01/13/23 1413   BP: 152/95   Pulse: 95         Visual Acuity      ED Medications  Medications   ketorolac (TORADOL) injection 15 mg (15 mg Intravenous Given 1/13/23 1545)   iohexol (OMNIPAQUE) 350 MG/ML injection (SINGLE-DOSE) 85 mL (85 mL Intravenous Given 1/13/23 1615)       Diagnostic Studies  Results Reviewed     Procedure Component Value Units Date/Time    Comprehensive metabolic panel [[id number]] Collected: 01/13/23 1519    Lab Status: Final result Specimen: Blood from Arm, Left Updated: 01/13/23 1602     Sodium 140 mmol/L      Potassium 4 3 mmol/L      Chloride 99 mmol/L      CO2 28 mmol/L      ANION GAP 13 mmol/L      BUN 12 mg/dL      Creatinine 1 06 mg/dL      Glucose 81 mg/dL      Calcium 9 6 mg/dL      AST 31 U/L      ALT 38 U/L      Alkaline Phosphatase 104 U/L      Total Protein 8 1 g/dL      Albumin 4 3 g/dL      Total Bilirubin 0 56 mg/dL      eGFR 97 ml/min/1 73sq m     Narrative:      [place] guidelines for Chronic Kidney Disease (CKD):   •  Stage 1 with normal or high GFR (GFR > 90 mL/min/1 73 square meters)  • Stage 2 Mild CKD (GFR = 60-89 mL/min/1 73 square meters)  •  Stage 3A Moderate CKD (GFR = 45-59 mL/min/1 73 square meters)  •  Stage 3B Moderate CKD (GFR = 30-44 mL/min/1 73 square meters)  •  Stage 4 Severe CKD (GFR = 15-29 mL/min/1 73 square meters)  •  Stage 5 End Stage CKD (GFR <15 mL/min/1 73 square meters)  Note: GFR calculation is accurate only with a steady state creatinine    CBC and differential [[id number]]  (Abnormal) Collected: 01/13/23 1519    Lab Status: Final result Specimen: Blood from Arm, Left Updated: 01/13/23 1544     WBC 9 68 Thousand/uL      RBC 4 71 Million/uL      Hemoglobin 15 2 g/dL      Hematocrit 44 4 %      MCV 94 fL      MCH 32 3 pg      MCHC 34 2 g/dL      RDW 11 2 %      MPV 8 5 fL      Platelets 235 Thousands/uL      nRBC 0 /100 WBCs      Neutrophils Relative 72 %      Immat GRANS % 0 %      Lymphocytes Relative 17 %      Monocytes Relative 8 %      Eosinophils Relative 2 %      Basophils Relative 1 %      Neutrophils Absolute 6 97 Thousands/µL      Immature Grans Absolute 0 02 Thousand/uL      Lymphocytes Absolute 1 61 Thousands/µL      Monocytes Absolute 0 81 Thousand/µL      Eosinophils Absolute 0 20 Thousand/µL      Basophils Absolute 0 07 Thousands/µL                  CT facial bones with contrast   Final Result by Romina Nava MD (01/13 1638)      Left facial cellulitis with phlegmon and small abscess inferior to the left mandible angle adjacent to left third mandibular molar periapical cyst that arises from the lingual cortex of the mandible  The study was marked in [facility] for immediate notification  Workstation performed: [id number]                    Procedures  Procedures         ED Course                               SBIRT 20yo+    Flowsheet Row Most Recent Value   SBIRT (23 yo +)    In order to provide better care to our patients, we are screening all of our patients for alcohol and drug use  Would it be okay to ask you these screening questions?  No Filed at: 01/13/2023 1436                    Medical Decision Making  This is a 61-year-old male who presents to the ED today for an enlarging mass under his left mandible started 2 weeks ago  Started antibiotics yesterday by his PCP  PCP sent to ER after abscess enlarged overnight and became more painful  Patient has poor dentition, in particular the back lower molars bilaterally  No abscess seen in the gingiva on the left, but edema noted from the left cheek down below the left mandible  Mass is appreciated in the left submandibular region  Patient offers no constitutional symptoms such as fever or chills nor is he having dysphagia or difficulty maintaining his airway  Obtain lab work to include CBC and CMP and obtain CT of the facial bones with contrast to elicit the extent of the abscess  CT of facial bones with contrast showed left facial cellulitis with phlegmon and small abscess of the left submandibular region  Reviewed results with patient and discussed risk versus benefit of draining the abscess  However on bedside ultrasound unable to isolate abscess  I&D of abscess not recommended  Patient also advised of lab work, which was normal   No evidence of systemic infection including fever chills or leukocytosis  Recommend continue warm compresses, salt water gargles, continue Keflex p o  and add Bactrim DS  We will also give one-time dose of Rocephin 1 g IV here in the ED  Discussed with patient the need for close follow-up and if not improved in 2-3 days, go back to PCP for possible referral to ENT surgery  Patient states he has Motrin 600 mg tabs at home that he will use for pain control  Instructed to take with food  Patient agreeable with plan and verbalized understanding  Facial cellulitis: acute illness or injury  Soft tissue abscess: acute illness or injury  Amount and/or Complexity of Data Reviewed  Labs: ordered  Decision-making details documented in ED Course    Radiology: ordered  Decision-making details documented in ED Course  Risk  Prescription drug management  Disposition  Final diagnoses:   None     ED Disposition     None      Follow-up Information    None         Patient's Medications   Discharge Prescriptions    No medications on file       No discharge procedures on file      PDMP Review       Value Time User    PDMP Reviewed  Yes 8/18/2022 10:23 AM Alicia Clarke DO          ED Provider  Electronically Signed by           Kellee Doe PA-C  01/13/23 3995

## 2023-01-13 NOTE — Clinical Note
Darshan Waller was seen and treated in our emergency department on 1/13/2023  No restrictions            Diagnosis:     Kirk Mojica    He may return on this date:     Please excuse Darshan Waller from work on 01/13/2023  If you have any questions or concerns, please don't hesitate to call        Sommer Samples, DO    ______________________________           _______________          _______________  Hospital Representative                              Date                                Time

## 2023-01-16 ENCOUNTER — OFFICE VISIT (OUTPATIENT)
Dept: FAMILY MEDICINE CLINIC | Facility: CLINIC | Age: 28
End: 2023-01-16

## 2023-01-16 VITALS — DIASTOLIC BLOOD PRESSURE: 88 MMHG | TEMPERATURE: 97.6 F | SYSTOLIC BLOOD PRESSURE: 128 MMHG

## 2023-01-16 DIAGNOSIS — R03.0 ELEVATED BLOOD PRESSURE READING: ICD-10-CM

## 2023-01-16 DIAGNOSIS — R19.7 DIARRHEA, UNSPECIFIED TYPE: ICD-10-CM

## 2023-01-16 DIAGNOSIS — L02.91 ABSCESS: Primary | ICD-10-CM

## 2023-01-16 RX ORDER — ADHESIVE BANDAGE 3/4"
BANDAGE TOPICAL DAILY
Qty: 1 EACH | Refills: 0 | Status: SHIPPED | OUTPATIENT
Start: 2023-01-16

## 2023-01-16 RX ORDER — SACCHAROMYCES BOULARDII 250 MG
250 CAPSULE ORAL 2 TIMES DAILY
Qty: 30 CAPSULE | Refills: 1 | Status: SHIPPED | OUTPATIENT
Start: 2023-01-16

## 2023-01-16 NOTE — PROGRESS NOTES
Assessment/Plan: patient was initially assessed at our office with swelling and pain under the left mandible was diagnosed with an abscess and begun on Keflex 500 q i d   Patient contacted our office that there was a doubling of the size of the abscess January 13th advised the patient that he should present to the emergency room  CT scan performed revealed cellulitis with an abscess  Attempt to drain was aborted due to the location of the abscess  The patient presents today with continued abscess and with the concern of diarrhea that may be C diff  Will perform a stool test for C diff  We also will refer to ENT for evaluation and treatment of the abscess  The patient will begun on florasto has a probiotic  Contacted the patient and informed him that he should seek add dental care for tooth extraction after communicating with ENT    Problem List Items Addressed This Visit    None  Visit Diagnoses     Abscess    -  Primary    Relevant Orders    Clostridium difficile toxin by PCR    Ambulatory Referral to Otolaryngology    Diarrhea, unspecified type        Relevant Medications    saccharomyces boulardii (FLORASTOR) 250 mg capsule    Elevated blood pressure reading        Relevant Medications    Blood Pressure Monitoring (Blood Pressure Cuff) MISC           Diagnoses and all orders for this visit:    Abscess  -     Clostridium difficile toxin by PCR; Future  -     Ambulatory Referral to Otolaryngology; Future    Diarrhea, unspecified type  -     saccharomyces boulardii (FLORASTOR) 250 mg capsule; Take 1 capsule (250 mg total) by mouth 2 (two) times a day    Elevated blood pressure reading  -     Blood Pressure Monitoring (Blood Pressure Cuff) MISC; Use in the morning        No problem-specific Assessment & Plan notes found for this encounter  PHQ-2/9 Depression Screening            There is no height or weight on file to calculate BMI  BMI Counseling:  There is no height or weight on file to calculate BMI  The BMI     Subjective:      Patient ID: Francheska Crowley is a 24 y o  male  Patient presents with continued difficulty on opening his mouth with continued pain of the abscess of the left jaw      The following portions of the patient's history were reviewed and updated as appropriate:   He has a past medical history of Vitamin D deficiency  ,  does not have a problem list on file  ,   has no past surgical history on file  ,  family history is not on file  ,   reports that he has never smoked  His smokeless tobacco use includes chew  He reports current alcohol use  He reports that he does not use drugs  ,  is allergic to vaseline [petrolatum]     Current Outpatient Medications   Medication Sig Dispense Refill   • Blood Pressure Monitoring (Blood Pressure Cuff) MISC Use in the morning 1 each 0   • cephalexin (KEFLEX) 500 mg capsule Take 1 capsule (500 mg total) by mouth every 6 (six) hours for 10 days 40 capsule 0   • saccharomyces boulardii (FLORASTOR) 250 mg capsule Take 1 capsule (250 mg total) by mouth 2 (two) times a day 30 capsule 1   • sulfamethoxazole-trimethoprim (BACTRIM DS) 800-160 mg per tablet Take 1 tablet by mouth 2 (two) times a day for 10 days smx-tmp DS (BACTRIM) 800-160 mg tabs (1tab q12 D10) 20 tablet 0   • Acetaminophen (TYLENOL PO) Take by mouth (Patient not taking: Reported on 2/24/2022 )     • methylPREDNISolone 4 MG tablet therapy pack Use as directed on package (Patient not taking: Reported on 2/24/2022 ) 21 each 0   • methylPREDNISolone 4 MG tablet therapy pack Use as directed on package (Patient not taking: Reported on 5/9/2022 ) 21 each 0   • Promethazine-DM (PHENERGAN-DM) 6 25-15 mg/5 mL oral syrup Take 5 mL by mouth 4 (four) times a day as needed for cough (Patient not taking: Reported on 5/9/2022 ) 180 mL 1   • zolpidem (AMBIEN) 10 mg tablet Take 1 tablet (10 mg total) by mouth daily at bedtime as needed for sleep 30 tablet 2     No current facility-administered medications for this visit  Review of Systems   Constitutional: Negative for chills and fever  HENT: Negative for ear pain and sore throat  Abscess under the left mandible causing difficulty eating   Eyes: Negative for pain and visual disturbance  Respiratory: Negative for cough and shortness of breath  Cardiovascular: Negative for chest pain and palpitations  Gastrointestinal: Negative for abdominal pain and vomiting  Genitourinary: Negative for dysuria and hematuria  Musculoskeletal: Negative for arthralgias and back pain  Skin: Negative for color change and rash  Neurological: Negative for seizures and syncope  All other systems reviewed and are negative  CT facial bones with contrast  Status: Final result     PACS Images     Show images for CT facial bones with contrast  Study Result    Narrative & Impression   CT FACIAL SOFT TISSUES WITH INTRAVENOUS CONTRAST     INDICATION:   Sublingual/submandibular abscess  evaluate abscess L lower mandible/neck      COMPARISON: None      TECHNIQUE:  Axial images through the facial soft tissues were performed  Reformatted images were created in multiple planes        Radiation dose length product (DLP) for this visit:  376 75 mGy-cm   This examination, like all CT scans performed in the [facility], was performed utilizing techniques to minimize radiation dose exposure, including the use of iterative   reconstruction and automated exposure control      IV Contrast:  85 mL of iohexol (OMNIPAQUE)     IMAGE QUALITY:  Diagnostic      FINDINGS:      SOFT TISSUES: Left facial cellulitis overlying the left mandible/submandibular region  There is phlegmon adjacent to the mandible angle as well as the 9 x 4 x 5 mm abscess (image 20 series 2 just inferior to the left mandible angle adjacent to left third   mandibular molar periapical lucency that erodes through the lingual cortex of the mandible   Asymmetric thickening of the inferior left masseter muscle concerning for myositis  Mild reactive adenopathy      DENTITION: Bilateral third mandibular molar dental caries with periapical lucencies, left larger than right with erosion of the lingual cortex of the left mandible      FACIAL BONES: There is no facial bone fracture identified  No discrete lytic or blastic lesion  No destructive lesions      ORBITS: Normal globes  Preseptal and retrobulbar soft tissues are unremarkable      SINUSES: The paranasal sinuses are clear      IMPRESSION:     Left facial cellulitis with phlegmon and small abscess inferior to the left mandible angle adjacent to left third mandibular molar periapical cyst that arises from the lingual cortex of the mandible      The study was marked in EPIC for immediate notification         Workstation performed: [id number]        Imaging    CT facial bones with contrast (Order: [id number]) - 1/13/2023    Result History    CT facial bones with contrast (Order #[id number]) on 1/13/2023 - Order Result History Report    Order Report     Order Details    Order Questions    Question Answer   What is the patient's sedation requirement? No Sedation   Contrast Information: IV ONLY   Note: If this is a PO only order, please change to a CT ABDOMEN PELVIS WO   Did the patient ever have a reaction to x-ray dye? If yes, please verify the type of allergy and order the contrast allergy prep  No   Note: If yes, please verify the type of allergy and order the contrast allergy prep  [facility]   Exam reason evaluate abscess L lower mandible/neck   Note: Enter reason for exam            Result Information    Status Priority Source   Final result (1/13/2023  4:38 PM) STAT      Other Results from 1/13/2023     CBC and differential  Final result 1/13/2023    Comprehensive metabolic panel  Final result 1/13/2023     Reason for Exam    evaluate abscess L lower mandible/neck;  Sublingual/submandibular abscess; evaluate abscess L lower mandible/neck     All Reviewers List    April Cherelle Moon PA-C on 1/13/2023  6:25 PM       CT facial bones with contrast: Patient Communication     Add Comments   Seen       Signed by    Signed Date/Time  Phone Pager   Alejandro Mccartney 1/13/2023 16:38 [phone]      Exam Information    Status Exam Begun  Exam Ended  Performing Tech   Final [99] 1/13/2023 16:07 1/13/2023 16:12 Bushra Bustillos     Screening Form Questions    No questions have been answered for this form  External Results Report    Open External Results Report      Encounter    View Encounter               Patient Care Timeline    No data selected in time range    Pre-op Summary    Pre-op             Recovery Summary    Recovery               Routing History    None       Objective:    /88 (BP Location: Left arm, Patient Position: Sitting, Cuff Size: Standard)   Temp 97 6 °F (36 4 °C) (Tympanic)   There is no height or weight on file to calculate BMI  Physical Exam  Constitutional:       Appearance: He is well-developed  HENT:      Head: Normocephalic  Comments:  Area of abscess  Eyes:      Pupils: Pupils are equal, round, and reactive to light  Cardiovascular:      Rate and Rhythm: Normal rate and regular rhythm  Heart sounds: Normal heart sounds  Pulmonary:      Effort: Pulmonary effort is normal       Breath sounds: Normal breath sounds  Abdominal:      General: Bowel sounds are normal       Palpations: Abdomen is soft  Tenderness: There is no abdominal tenderness  Musculoskeletal:      Cervical back: Normal range of motion  Skin:     General: Skin is warm  Neurological:      Mental Status: He is alert and oriented to person, place, and time

## 2023-01-17 ENCOUNTER — OFFICE VISIT (OUTPATIENT)
Dept: OTOLARYNGOLOGY | Facility: CLINIC | Age: 28
End: 2023-01-17

## 2023-01-17 ENCOUNTER — TELEPHONE (OUTPATIENT)
Dept: OTOLARYNGOLOGY | Facility: CLINIC | Age: 28
End: 2023-01-17

## 2023-01-17 VITALS
TEMPERATURE: 98 F | HEART RATE: 89 BPM | HEIGHT: 74 IN | DIASTOLIC BLOOD PRESSURE: 80 MMHG | OXYGEN SATURATION: 98 % | SYSTOLIC BLOOD PRESSURE: 108 MMHG | BODY MASS INDEX: 23.44 KG/M2 | WEIGHT: 182.6 LBS

## 2023-01-17 DIAGNOSIS — K01.1 DENTAL IMPACTION: ICD-10-CM

## 2023-01-17 DIAGNOSIS — K05.6 PERIODONTAL DISEASE: Primary | ICD-10-CM

## 2023-01-17 DIAGNOSIS — L02.91 ABSCESS: ICD-10-CM

## 2023-01-17 DIAGNOSIS — H61.893 DRY BOTH EAR CANALS: ICD-10-CM

## 2023-01-17 RX ORDER — IBUPROFEN 600 MG/1
600 TABLET ORAL EVERY 8 HOURS PRN
COMMUNITY
Start: 2022-08-31 | End: 2023-08-31

## 2023-01-17 RX ORDER — FLUOCINOLONE ACETONIDE 0.11 MG/ML
5 OIL AURICULAR (OTIC) 2 TIMES DAILY
Qty: 20 ML | Refills: 0 | Status: SHIPPED | OUTPATIENT
Start: 2023-01-17

## 2023-01-17 RX ORDER — CLINDAMYCIN HYDROCHLORIDE 300 MG/1
300 CAPSULE ORAL 3 TIMES DAILY
Qty: 30 CAPSULE | Refills: 0 | Status: SHIPPED | OUTPATIENT
Start: 2023-01-17 | End: 2023-01-27

## 2023-01-17 NOTE — TELEPHONE ENCOUNTER
CLM on Demario's and his mother's phone to have them call ENT  Janae Salinas should be seen today [place] [facility] [place]

## 2023-01-17 NOTE — PROGRESS NOTES
Review of Systems   Constitutional: Negative  HENT: Negative  Eyes: Negative  Respiratory: Negative  Cardiovascular: Negative  Gastrointestinal: Negative  Endocrine: Negative  Genitourinary: Negative  Musculoskeletal: Positive for neck pain and neck stiffness  Skin: Negative  Allergic/Immunologic: Negative  Neurological: Negative  Hematological: Negative  Psychiatric/Behavioral: Negative

## 2023-01-17 NOTE — PROGRESS NOTES
Otolaryngology Clinic Visit  Name:  Zhou Jnoes  MRN:  [id number]  Date:  1/17/2023 4:02 PM  ________________________________________________________________________       CHIEF COMPLAINT:   Dental infection     HPI:  Zhou Jones is a 24 y o  male with past medical history as below who is here today for evaluation of a dental infection  He has been seen in the emergency room and they were not sure what was going on with him  He does think that he has an impacted molar on the left side that is giving him issues  He reports that like this for about 2 weeks he has been having issues opening his mouth due to pain  He has been on 2 different antibiotics without much improvement  He is getting a little bit better  Some similar issues on the right  Some ear dryness  No other ENT questions or concerns    PMHx:  Past Medical History:   Diagnosis Date   • Vitamin D deficiency        PSHx:  History reviewed  No pertinent surgical history  FAMHx:  History reviewed  No pertinent family history  SOCHx:  Social History     Socioeconomic History   • Marital status: Single     Spouse name: None   • Number of children: None   • Years of education: None   • Highest education level: None   Occupational History   • None   Tobacco Use   • Smoking status: Never   • Smokeless tobacco: Current     Types: Chew   Vaping Use   • Vaping Use: Never used   Substance and Sexual Activity   • Alcohol use: Yes     Comment: occassional   • Drug use: No   • Sexual activity: None   Other Topics Concern   • None   Social History Narrative   • None     Social Determinants of Health     Financial Resource Strain: Not on file   Food Insecurity: Not on file   Transportation Needs: Not on file   Physical Activity: Not on file   Stress: Not on file   Social Connections: Not on file   Intimate Partner Violence: Not on file   Housing Stability: Not on file       Allergies:   Allergies   Allergen Reactions   • Vaseline [Petrolatum] Rash MEDS:  Reviewed    ROS:  As above       PHYSICAL EXAM:  /80 (BP Location: Left arm, Cuff Size: Large)   Pulse 89   Temp 98 °F (36 7 °C) (Temporal)   Ht 6' 2" (1 88 m)   Wt 82 8 kg (182 lb 9 6 oz)   SpO2 98%   BMI 23 44 kg/m²   General: NAD, AOx4  Eyes:  EOMI  Ears:  Right: ear canal normal, TM normal apperance, no fluid  Left: ear canal normal, TM normal apperance, no fluid  Nose:  No septal deviation, no inferior turbinate hypertrophy, no mass/lesions  Oral cavity:  No trismus, no mass/lesions,  Fair dentition bilateral imapcted third molars, purulence from Left molar  Neck: Trachea is midline; no thyroid nodules, Salivary glands symmetrical, no masses/abnormality on palpation  Lymph:  Left level II LAD  Skin:  No obvious facial lesions  Neuro: Face symmetrical, no obvious cranial nerve palsy  No focal deficits   Lungs:  Normal work of breathing, symmetrical chest expansion  Vascular: Well perfused    Procedures:  None     Medical Data Reviewed:  Records reviewed and summarized as in [facility]    Radiology:  CT reviewed, odontogenic abscess/lucency along left third molar    Labs:  None     There is no problem list on file for this patient  ASSESSMENT/PLAN:  Mayra Sevilla is a 24 y o  male with acute and chronic problems as above who presents with:    1  Periodontal disease    2  Abscess    3  Dental impaction    4  Dry both ear canals        24 y o  here today for further evaluation of neck swelling  He has left impacted and infected molar  Discussed he needs to see an oral surgeon for removal   Gave the numbers to [facility] oral surgery and he says he has a contact up in [address] [place], [place] [phone]  We will transition him to clindamycin  He does have some dry ears and he is can try DermOtic for that          Sarah Enamorado MD MPH  Otolaryngology--Head and Neck Surgery  [facility]  1/17/2023 4:02 PM

## 2023-01-27 ENCOUNTER — OFFICE VISIT (OUTPATIENT)
Dept: FAMILY MEDICINE CLINIC | Facility: CLINIC | Age: 28
End: 2023-01-27

## 2023-01-27 VITALS
HEIGHT: 74 IN | BODY MASS INDEX: 23.1 KG/M2 | TEMPERATURE: 98.1 F | WEIGHT: 180 LBS | SYSTOLIC BLOOD PRESSURE: 122 MMHG | HEART RATE: 84 BPM | RESPIRATION RATE: 20 BRPM | DIASTOLIC BLOOD PRESSURE: 70 MMHG

## 2023-01-27 DIAGNOSIS — L02.91 ABSCESS: Primary | ICD-10-CM

## 2023-01-27 DIAGNOSIS — K08.409 HISTORY OF TOOTH EXTRACTION, UNSPECIFIED EDENTULISM CLASS: ICD-10-CM

## 2023-01-27 RX ORDER — AMOXICILLIN 500 MG/1
500 TABLET, FILM COATED ORAL 3 TIMES DAILY
COMMUNITY
Start: 2023-01-20

## 2023-01-27 NOTE — PROGRESS NOTES
Assessment/Plan: Post dental extraction for impacted teeth with abscess extraction performed January 20 the patient is cleared to return to work 1/29/23    Diarrhea has resolved    Problem List Items Addressed This Visit    None  Visit Diagnoses     Abscess    -  Primary           Diagnoses and all orders for this visit:    Abscess    Other orders  -     amoxicillin (AMOXIL) 500 MG tablet; Take 500 mg by mouth 3 (three) times a day        No problem-specific Assessment & Plan notes found for this encounter  PHQ-2/9 Depression Screening            Body mass index is 23 11 kg/m²  BMI Counseling: Body mass index is 23 11 kg/m²  The BMI   Subjective:      Patient ID: Alvin Sanders is a 24 y o  male  Patient presents for clearance to return to work      The following portions of the patient's history were reviewed and updated as appropriate:   He has a past medical history of Vitamin D deficiency  ,  does not have a problem list on file  ,   has a past surgical history that includes [place] tooth extraction (01/20/2023)  ,  family history is not on file  ,   reports that he has never smoked  His smokeless tobacco use includes chew  He reports current alcohol use  He reports that he does not use drugs  ,  is allergic to vaseline [petrolatum]     Current Outpatient Medications   Medication Sig Dispense Refill   • Acetaminophen (TYLENOL PO) Take by mouth     • amoxicillin (AMOXIL) 500 MG tablet Take 500 mg by mouth 3 (three) times a day     • Blood Pressure Monitoring (Blood Pressure Cuff) MISC Use in the morning 1 each 0   • clindamycin (CLEOCIN) 300 MG capsule Take 1 capsule (300 mg total) by mouth 3 (three) times a day for 10 days 30 capsule 0   • fluocinolone acetonide (DermOtic) 0 01 % otic oil Administer 5 drops into both ears 2 (two) times a day 20 mL 0   • ibuprofen (MOTRIN) 600 mg tablet Take 600 mg by mouth every 8 (eight) hours as needed     • saccharomyces boulardii (FLORASTOR) 250 mg capsule Take 1 capsule (250 mg total) by mouth 2 (two) times a day 30 capsule 1   • zolpidem (AMBIEN) 10 mg tablet Take 1 tablet (10 mg total) by mouth daily at bedtime as needed for sleep 30 tablet 2     No current facility-administered medications for this visit  Review of Systems   Constitutional: Negative for chills and fever  HENT: Negative for ear pain and sore throat  Eyes: Negative for pain and visual disturbance  Respiratory: Negative for cough and shortness of breath  Cardiovascular: Negative for chest pain and palpitations  Gastrointestinal: Negative for abdominal pain and vomiting  Genitourinary: Negative for dysuria and hematuria  Musculoskeletal: Negative for arthralgias and back pain  Skin: Negative for color change and rash  Neurological: Negative for seizures and syncope  All other systems reviewed and are negative  Objective:    /70   Pulse 84   Temp 98 1 °F (36 7 °C)   Resp 20   Ht 6' 2" (1 88 m)   Wt 81 6 kg (180 lb)   BMI 23 11 kg/m²   Body mass index is 23 11 kg/m²  Physical Exam  Constitutional:       Appearance: He is well-developed  HENT:      Head: Normocephalic  Eyes:      Pupils: Pupils are equal, round, and reactive to light  Cardiovascular:      Rate and Rhythm: Normal rate and regular rhythm  Heart sounds: Normal heart sounds  Pulmonary:      Effort: Pulmonary effort is normal       Breath sounds: Normal breath sounds  Abdominal:      General: Bowel sounds are normal       Palpations: Abdomen is soft  Tenderness: There is no abdominal tenderness  Musculoskeletal:      Cervical back: Normal range of motion  Skin:     General: Skin is warm  Neurological:      Mental Status: He is alert and oriented to person, place, and time

## 2023-03-30 ENCOUNTER — TELEPHONE (OUTPATIENT)
Dept: FAMILY MEDICINE CLINIC | Facility: CLINIC | Age: 28
End: 2023-03-30

## 2023-03-30 DIAGNOSIS — J32.9 SINUSITIS, UNSPECIFIED CHRONICITY, UNSPECIFIED LOCATION: Primary | ICD-10-CM

## 2023-03-30 RX ORDER — AMOXICILLIN 500 MG/1
500 TABLET, FILM COATED ORAL 3 TIMES DAILY
Qty: 30 TABLET | Refills: 0 | Status: SHIPPED | OUTPATIENT
Start: 2023-03-30 | End: 2023-04-09

## 2023-03-30 NOTE — TELEPHONE ENCOUNTER
Patient states he has sinus pain pressure, feels as if he has another sinus infection, no fever no other symptoms he was wondering if he could have a prescription sent to pharmacy

## 2023-06-21 ENCOUNTER — OFFICE VISIT (OUTPATIENT)
Dept: FAMILY MEDICINE CLINIC | Facility: CLINIC | Age: 28
End: 2023-06-21
Payer: COMMERCIAL

## 2023-06-21 VITALS
DIASTOLIC BLOOD PRESSURE: 78 MMHG | SYSTOLIC BLOOD PRESSURE: 142 MMHG | BODY MASS INDEX: 24.26 KG/M2 | HEIGHT: 74 IN | HEART RATE: 88 BPM | WEIGHT: 189 LBS | TEMPERATURE: 98.6 F | RESPIRATION RATE: 16 BRPM

## 2023-06-21 DIAGNOSIS — R03.0 ELEVATED BLOOD PRESSURE READING: ICD-10-CM

## 2023-06-21 DIAGNOSIS — R42 VERTIGO: ICD-10-CM

## 2023-06-21 DIAGNOSIS — G43.509 PERSISTENT MIGRAINE AURA WITHOUT CEREBRAL INFARCTION AND WITHOUT STATUS MIGRAINOSUS, NOT INTRACTABLE: Primary | ICD-10-CM

## 2023-06-21 DIAGNOSIS — J34.89 NOSE DRYNESS: ICD-10-CM

## 2023-06-21 PROCEDURE — 99214 OFFICE O/P EST MOD 30 MIN: CPT | Performed by: FAMILY MEDICINE

## 2023-06-21 RX ORDER — SUMATRIPTAN 50 MG/1
50 TABLET, FILM COATED ORAL ONCE AS NEEDED
Qty: 10 TABLET | Refills: 5 | Status: SHIPPED | OUTPATIENT
Start: 2023-06-21

## 2023-06-21 RX ORDER — MECLIZINE HCL 12.5 MG/1
12.5 TABLET ORAL 3 TIMES DAILY PRN
Qty: 30 TABLET | Refills: 0 | Status: SHIPPED | OUTPATIENT
Start: 2023-06-21

## 2023-06-21 NOTE — PROGRESS NOTES
Assessment/Plan: Headache with visual disturbance photophobia and audiophobia began yesterday  Similar headache occurred 3 weeks ago  The headache is most consistent with migraine  We will prescribe Imitrex 50 mg     Vertigo with horizontal nystagmus we will supply Antivert 12 5 twice daily for 10 days informed the patient if the dizziness persists beyond 10 days will refer to ENT    Elevated blood pressure reading 142/78 the patient does have a blood pressure monitor at home advised to monitor blood pressure and contact us if it remains elevated    The patient has been taking Synex which contains  Phenylephrine informed the patient to discontinue the medication it can raise his blood pressure and trigger migraines  The patient states that he has been using the medication for dry nose  Informed the patient to use over-the-counter saline nasal drops    Problem List Items Addressed This Visit    None  Visit Diagnoses     Persistent migraine aura without cerebral infarction and without status migrainosus, not intractable    -  Primary    Elevated blood pressure affecting pregnancy in first trimester, antepartum        Vertigo        Nose dryness               Diagnoses and all orders for this visit:    Persistent migraine aura without cerebral infarction and without status migrainosus, not intractable    Elevated blood pressure affecting pregnancy in first trimester, antepartum    Vertigo    Nose dryness    Other orders  -     Phenylephrine HCl (SINEX REGULAR NA); into each nostril        No problem-specific Assessment & Plan notes found for this encounter  PHQ-2/9 Depression Screening    Little interest or pleasure in doing things: 0 - not at all  Feeling down, depressed, or hopeless: 0 - not at all  PHQ-2 Score: 0  PHQ-2 Interpretation: Negative depression screen          Body mass index is 24 27 kg/m²  BMI Counseling: Body mass index is 24 27 kg/m²   The BMI     Subjective:      Patient ID: Mitra Coronado Ez Polo is a 34 y o  male  Patient presents with a 1 day history of headache with aura and visual disturbance      The following portions of the patient's history were reviewed and updated as appropriate:   He has a past medical history of Vitamin D deficiency  ,  does not have a problem list on file  ,   has a past surgical history that includes [place] tooth extraction (01/20/2023)  ,  family history is not on file  ,   reports that he has never smoked  His smokeless tobacco use includes chew  He reports current alcohol use  He reports that he does not use drugs  ,  is allergic to vaseline [petrolatum]     Current Outpatient Medications   Medication Sig Dispense Refill   • Acetaminophen (TYLENOL PO) Take by mouth     • fluocinolone acetonide (DermOtic) 0 01 % otic oil Administer 5 drops into both ears 2 (two) times a day 20 mL 0   • Phenylephrine HCl (SINEX REGULAR NA) into each nostril     • zolpidem (AMBIEN) 10 mg tablet Take 1 tablet (10 mg total) by mouth daily at bedtime as needed for sleep 30 tablet 2   • Blood Pressure Monitoring (Blood Pressure Cuff) MISC Use in the morning 1 each 0   • ibuprofen (MOTRIN) 600 mg tablet Take 600 mg by mouth every 8 (eight) hours as needed (Patient not taking: Reported on 6/21/2023)     • saccharomyces boulardii (FLORASTOR) 250 mg capsule Take 1 capsule (250 mg total) by mouth 2 (two) times a day (Patient not taking: Reported on 6/21/2023) 30 capsule 1     No current facility-administered medications for this visit  Review of Systems   Constitutional: Negative for chills and fever  HENT: Negative for ear pain and sore throat  Eyes: Negative for pain and visual disturbance  Respiratory: Negative for cough and shortness of breath  Cardiovascular: Negative for chest pain and palpitations  Gastrointestinal: Negative for abdominal pain and vomiting  Genitourinary: Negative for dysuria and hematuria  Musculoskeletal: Negative for arthralgias and back pain     Skin: "Negative for color change and rash  Neurological: Positive for dizziness and headaches  Negative for seizures and syncope  All other systems reviewed and are negative  Objective:    /78   Pulse 88   Temp 98 6 °F (37 °C)   Resp 16   Ht 6' 2\" (1 88 m)   Wt 85 7 kg (189 lb)   BMI 24 27 kg/m²   Body mass index is 24 27 kg/m²  Physical Exam  Constitutional:       Appearance: Normal appearance  He is well-developed  HENT:      Head: Normocephalic and atraumatic  Right Ear: Tympanic membrane, ear canal and external ear normal       Left Ear: Tympanic membrane, ear canal and external ear normal       Nose: Nose normal       Mouth/Throat:      Mouth: Mucous membranes are moist       Pharynx: Oropharynx is clear  Eyes:      Extraocular Movements: Extraocular movements intact  Conjunctiva/sclera: Conjunctivae normal       Pupils: Pupils are equal, round, and reactive to light  Cardiovascular:      Rate and Rhythm: Normal rate and regular rhythm  Pulses: Normal pulses  Heart sounds: Normal heart sounds  Pulmonary:      Effort: Pulmonary effort is normal       Breath sounds: Normal breath sounds  Abdominal:      General: Abdomen is flat  Bowel sounds are normal       Palpations: Abdomen is soft  Tenderness: There is no abdominal tenderness  Musculoskeletal:         General: Normal range of motion  Cervical back: Normal range of motion and neck supple  Skin:     General: Skin is warm and dry  Capillary Refill: Capillary refill takes less than 2 seconds  Neurological:      General: No focal deficit present  Mental Status: He is alert and oriented to person, place, and time  Psychiatric:         Mood and Affect: Mood normal          Behavior: Behavior normal          Thought Content:  Thought content normal          Judgment: Judgment normal            "

## 2023-07-25 ENCOUNTER — OFFICE VISIT (OUTPATIENT)
Dept: FAMILY MEDICINE CLINIC | Facility: CLINIC | Age: 28
End: 2023-07-25
Payer: COMMERCIAL

## 2023-07-25 VITALS
SYSTOLIC BLOOD PRESSURE: 134 MMHG | RESPIRATION RATE: 20 BRPM | TEMPERATURE: 98.2 F | WEIGHT: 190 LBS | HEIGHT: 74 IN | DIASTOLIC BLOOD PRESSURE: 80 MMHG | BODY MASS INDEX: 24.38 KG/M2 | HEART RATE: 84 BPM

## 2023-07-25 DIAGNOSIS — M25.562 ACUTE PAIN OF LEFT KNEE: Primary | ICD-10-CM

## 2023-07-25 PROCEDURE — 99213 OFFICE O/P EST LOW 20 MIN: CPT | Performed by: FAMILY MEDICINE

## 2023-07-25 RX ORDER — DICLOFENAC SODIUM 75 MG/1
75 TABLET, DELAYED RELEASE ORAL 2 TIMES DAILY
Qty: 20 TABLET | Refills: 0 | Status: SHIPPED | OUTPATIENT
Start: 2023-07-25

## 2023-07-25 NOTE — PROGRESS NOTES
Assessment/Plan: Left knee sprain we will provide an x-ray of the knee and knee brace and Voltaren 75 mg twice daily for pain and inflammation. If the pain persist the patient is to contact us for referral to orthopedics    Problem List Items Addressed This Visit    None  Visit Diagnoses     Acute pain of left knee    -  Primary    Relevant Medications    Elastic Bandages & Supports (Knee Brace/Flex Stays Large) MISC    diclofenac (VOLTAREN) 75 mg EC tablet    Other Relevant Orders    XR knee 4+ vw left injury           Diagnoses and all orders for this visit:    Acute pain of left knee  -     XR knee 4+ vw left injury; Future  -     Elastic Bandages & Supports (Knee Brace/Flex Stays Large) MISC; Use in the morning Left knee brace Wear daily  -     diclofenac (VOLTAREN) 75 mg EC tablet; Take 1 tablet (75 mg total) by mouth 2 (two) times a day        No problem-specific Assessment & Plan notes found for this encounter. PHQ-2/9 Depression Screening            Body mass index is 24.39 kg/m². BMI Counseling: Body mass index is 24.39 kg/m². The BMI   Subjective:      Patient ID: Zechariah Fernández is a 34 y.o. male. Patient presents with complaint of injuring his knee while playing basketball 3 days ago    Knee Pain   The incident occurred 3 to 5 days ago. The incident occurred in the street. The injury mechanism was a twisting injury and a fall. The pain is present in the left knee. The quality of the pain is described as aching. The pain is at a severity of 6/10. The pain is moderate. The pain has been constant since onset. He reports no foreign bodies present. The symptoms are aggravated by movement. He has tried ice for the symptoms. The treatment provided mild relief. The following portions of the patient's history were reviewed and updated as appropriate:   He has a past medical history of Vitamin D deficiency. ,  does not have a problem list on file. ,   has a past surgical history that includes [place] tooth extraction (01/20/2023). ,  family history is not on file. ,   reports that he has never smoked. His smokeless tobacco use includes chew. He reports current alcohol use. He reports that he does not use drugs. ,  is allergic to vaseline [petrolatum]. .  Current Outpatient Medications   Medication Sig Dispense Refill   • diclofenac (VOLTAREN) 75 mg EC tablet Take 1 tablet (75 mg total) by mouth 2 (two) times a day 20 tablet 0   • Elastic Bandages & Supports (Knee Brace/Flex Stays Large) MISC Use in the morning Left knee brace Wear daily 1 each 0   • Acetaminophen (TYLENOL PO) Take by mouth     • Blood Pressure Monitoring (Blood Pressure Cuff) MISC Use in the morning 1 each 0   • fluocinolone acetonide (DermOtic) 0.01 % otic oil Administer 5 drops into both ears 2 (two) times a day 20 mL 0   • ibuprofen (MOTRIN) 600 mg tablet Take 600 mg by mouth every 8 (eight) hours as needed (Patient not taking: Reported on 6/21/2023)     • meclizine (ANTIVERT) 12.5 MG tablet Take 1 tablet (12.5 mg total) by mouth 3 (three) times a day as needed for dizziness 30 tablet 0   • Phenylephrine HCl (SINEX REGULAR NA) into each nostril     • saccharomyces boulardii (FLORASTOR) 250 mg capsule Take 1 capsule (250 mg total) by mouth 2 (two) times a day (Patient not taking: Reported on 6/21/2023) 30 capsule 1   • SUMAtriptan (IMITREX) 50 mg tablet Take 1 tablet (50 mg total) by mouth once as needed for migraine for up to 1 dose may repeat in 2 hours if necessary 10 tablet 5   • zolpidem (AMBIEN) 10 mg tablet Take 1 tablet (10 mg total) by mouth daily at bedtime as needed for sleep 30 tablet 2     No current facility-administered medications for this visit. Review of Systems   Constitutional: Negative for chills and fever. HENT: Negative for ear pain and sore throat. Eyes: Negative for pain and visual disturbance. Respiratory: Negative for cough and shortness of breath.     Cardiovascular: Negative for chest pain and palpitations. Gastrointestinal: Negative for abdominal pain and vomiting. Genitourinary: Negative for dysuria and hematuria. Musculoskeletal: Negative for arthralgias and back pain. Acute pain of the left knee   Skin: Negative for color change and rash. Neurological: Negative for seizures and syncope. All other systems reviewed and are negative. Objective:    /80   Pulse 84   Temp 98.2 °F (36.8 °C)   Resp 20   Ht 6' 2" (1.88 m)   Wt 86.2 kg (190 lb)   BMI 24.39 kg/m²   Body mass index is 24.39 kg/m². Physical Exam  Constitutional:       Appearance: Normal appearance. He is well-developed. HENT:      Head: Normocephalic and atraumatic. Right Ear: Tympanic membrane, ear canal and external ear normal.      Left Ear: Tympanic membrane, ear canal and external ear normal.      Nose: Nose normal.      Mouth/Throat:      Mouth: Mucous membranes are moist.      Pharynx: Oropharynx is clear. Eyes:      Extraocular Movements: Extraocular movements intact. Conjunctiva/sclera: Conjunctivae normal.      Pupils: Pupils are equal, round, and reactive to light. Cardiovascular:      Rate and Rhythm: Normal rate and regular rhythm. Pulses: Normal pulses. Heart sounds: Normal heart sounds. Pulmonary:      Effort: Pulmonary effort is normal.      Breath sounds: Normal breath sounds. Abdominal:      General: Abdomen is flat. Bowel sounds are normal.      Palpations: Abdomen is soft. Tenderness: There is no abdominal tenderness. Musculoskeletal:         General: Normal range of motion. Cervical back: Normal range of motion and neck supple. Legs:       Comments: Area of pain on palpation drawer sign is negative   Skin:     General: Skin is warm and dry. Capillary Refill: Capillary refill takes less than 2 seconds. Neurological:      General: No focal deficit present. Mental Status: He is alert and oriented to person, place, and time. Psychiatric:         Mood and Affect: Mood normal.         Behavior: Behavior normal.         Thought Content:  Thought content normal.         Judgment: Judgment normal.

## 2023-07-26 ENCOUNTER — HOSPITAL ENCOUNTER (OUTPATIENT)
Dept: RADIOLOGY | Facility: HOSPITAL | Age: 28
Discharge: HOME | End: 2023-07-26
Payer: COMMERCIAL

## 2023-07-26 DIAGNOSIS — M25.562 ACUTE PAIN OF LEFT KNEE: ICD-10-CM

## 2023-07-26 PROCEDURE — 73564 X-RAY EXAM KNEE 4 OR MORE: CPT

## 2023-08-07 ENCOUNTER — TELEPHONE (OUTPATIENT)
Dept: FAMILY MEDICINE CLINIC | Facility: CLINIC | Age: 28
End: 2023-08-07

## 2023-08-07 NOTE — TELEPHONE ENCOUNTER
----- Message from Marisela Goodman DO sent at 8/7/2023  1:30 PM EDT -----  Please call the patient regarding his knee x-ray is normal no fracture or dislocation

## 2023-09-21 ENCOUNTER — OFFICE VISIT (OUTPATIENT)
Dept: FAMILY MEDICINE CLINIC | Facility: CLINIC | Age: 28
End: 2023-09-21
Payer: COMMERCIAL

## 2023-09-21 VITALS
RESPIRATION RATE: 16 BRPM | SYSTOLIC BLOOD PRESSURE: 124 MMHG | DIASTOLIC BLOOD PRESSURE: 84 MMHG | WEIGHT: 197 LBS | HEIGHT: 74 IN | HEART RATE: 76 BPM | BODY MASS INDEX: 25.28 KG/M2 | TEMPERATURE: 98.1 F

## 2023-09-21 DIAGNOSIS — K52.9 GASTROENTERITIS: Primary | ICD-10-CM

## 2023-09-21 PROCEDURE — 99213 OFFICE O/P EST LOW 20 MIN: CPT | Performed by: FAMILY MEDICINE

## 2023-09-21 RX ORDER — ONDANSETRON 4 MG/1
4 TABLET, FILM COATED ORAL EVERY 8 HOURS PRN
Qty: 20 TABLET | Refills: 0 | Status: SHIPPED | OUTPATIENT
Start: 2023-09-21

## 2023-09-21 NOTE — PROGRESS NOTES
Assessment/Plan: Gastroenteritis patient states he ate a taco before symptoms started on Saturday the patient was advised to stay away from fried fatty greasy food for 5 days will be provided Zofran 4 mg every 4 hours as needed advised to stay hydrated. If symptoms continue stool culture has been placed    Problem List Items Addressed This Visit    None  Visit Diagnoses     Gastroenteritis    -  Primary    Relevant Medications    ondansetron (ZOFRAN) 4 mg tablet    Other Relevant Orders    Stool culture           Diagnoses and all orders for this visit:    Gastroenteritis  -     Stool culture; Future  -     ondansetron (ZOFRAN) 4 mg tablet; Take 1 tablet (4 mg total) by mouth every 8 (eight) hours as needed for nausea or vomiting        No problem-specific Assessment & Plan notes found for this encounter. PHQ-2/9 Depression Screening            Body mass index is 25.29 kg/m². BMI Counseling: Body mass index is 25.29 kg/m². The BMI   Subjective:      Patient ID: Zac Agustin is a 34 y.o. male. Patient states she has been sick for the last 5 days with nausea vomiting and diarrhea      The following portions of the patient's history were reviewed and updated as appropriate:   He has a past medical history of Vitamin D deficiency. ,  does not have a problem list on file. ,   has a past surgical history that includes [place] tooth extraction (01/20/2023). ,  family history is not on file. ,   reports that he has never smoked. His smokeless tobacco use includes chew. He reports current alcohol use. He reports that he does not use drugs. ,  is allergic to vaseline [petrolatum]. .  Current Outpatient Medications   Medication Sig Dispense Refill   • ondansetron (ZOFRAN) 4 mg tablet Take 1 tablet (4 mg total) by mouth every 8 (eight) hours as needed for nausea or vomiting 20 tablet 0   • Acetaminophen (TYLENOL PO) Take by mouth     • Blood Pressure Monitoring (Blood Pressure Cuff) MISC Use in the morning 1 each 0   • diclofenac (VOLTAREN) 75 mg EC tablet Take 1 tablet (75 mg total) by mouth 2 (two) times a day 20 tablet 0   • Elastic Bandages & Supports (Knee Brace/Flex Stays Large) MISC Use in the morning Left knee brace Wear daily 1 each 0   • fluocinolone acetonide (DermOtic) 0.01 % otic oil Administer 5 drops into both ears 2 (two) times a day 20 mL 0   • meclizine (ANTIVERT) 12.5 MG tablet Take 1 tablet (12.5 mg total) by mouth 3 (three) times a day as needed for dizziness 30 tablet 0   • Phenylephrine HCl (SINEX REGULAR NA) into each nostril     • saccharomyces boulardii (FLORASTOR) 250 mg capsule Take 1 capsule (250 mg total) by mouth 2 (two) times a day (Patient not taking: Reported on 6/21/2023) 30 capsule 1   • SUMAtriptan (IMITREX) 50 mg tablet Take 1 tablet (50 mg total) by mouth once as needed for migraine for up to 1 dose may repeat in 2 hours if necessary 10 tablet 5   • zolpidem (AMBIEN) 10 mg tablet Take 1 tablet (10 mg total) by mouth daily at bedtime as needed for sleep 30 tablet 2     No current facility-administered medications for this visit. Review of Systems   Constitutional: Negative for chills and fever. HENT: Negative for ear pain and sore throat. Eyes: Negative for pain and visual disturbance. Respiratory: Negative for cough and shortness of breath. Cardiovascular: Negative for chest pain and palpitations. Gastrointestinal: Positive for diarrhea, nausea and vomiting. Negative for abdominal pain. Genitourinary: Negative for dysuria and hematuria. Musculoskeletal: Negative for arthralgias and back pain. Skin: Negative for color change and rash. Neurological: Negative for seizures and syncope. All other systems reviewed and are negative. Objective:    /84   Pulse 76   Temp 98.1 °F (36.7 °C)   Resp 16   Ht 6' 2" (1.88 m)   Wt 89.4 kg (197 lb)   BMI 25.29 kg/m²   Body mass index is 25.29 kg/m².      Physical Exam  Constitutional:       Appearance: Normal appearance. He is well-developed. HENT:      Head: Normocephalic and atraumatic. Right Ear: Tympanic membrane, ear canal and external ear normal.      Left Ear: Tympanic membrane, ear canal and external ear normal.      Nose: Nose normal.      Mouth/Throat:      Mouth: Mucous membranes are moist.      Pharynx: Oropharynx is clear. Eyes:      Extraocular Movements: Extraocular movements intact. Conjunctiva/sclera: Conjunctivae normal.      Pupils: Pupils are equal, round, and reactive to light. Cardiovascular:      Rate and Rhythm: Normal rate and regular rhythm. Pulses: Normal pulses. Heart sounds: Normal heart sounds. Pulmonary:      Effort: Pulmonary effort is normal.      Breath sounds: Normal breath sounds. Abdominal:      General: Abdomen is flat. Bowel sounds are normal.      Palpations: Abdomen is soft. Tenderness: There is no abdominal tenderness. Musculoskeletal:         General: Normal range of motion. Cervical back: Normal range of motion and neck supple. Skin:     General: Skin is warm and dry. Capillary Refill: Capillary refill takes less than 2 seconds. Neurological:      General: No focal deficit present. Mental Status: He is alert and oriented to person, place, and time. Psychiatric:         Mood and Affect: Mood normal.         Behavior: Behavior normal.         Thought Content:  Thought content normal.         Judgment: Judgment normal.

## 2023-10-17 ENCOUNTER — OCCMED (OUTPATIENT)
Dept: URGENT CARE | Facility: CLINIC | Age: 28
End: 2023-10-17
Payer: OTHER MISCELLANEOUS

## 2023-10-17 ENCOUNTER — APPOINTMENT (OUTPATIENT)
Dept: RADIOLOGY | Facility: CLINIC | Age: 28
End: 2023-10-17
Payer: OTHER MISCELLANEOUS

## 2023-10-17 DIAGNOSIS — S69.92XA INJURY OF LEFT HAND, INITIAL ENCOUNTER: Primary | ICD-10-CM

## 2023-10-17 DIAGNOSIS — S69.92XA INJURY OF LEFT HAND, INITIAL ENCOUNTER: ICD-10-CM

## 2023-10-17 PROCEDURE — 73130 X-RAY EXAM OF HAND: CPT

## 2023-10-17 PROCEDURE — 99283 EMERGENCY DEPT VISIT LOW MDM: CPT | Performed by: PHYSICIAN ASSISTANT

## 2023-10-17 PROCEDURE — G0382 LEV 3 HOSP TYPE B ED VISIT: HCPCS | Performed by: PHYSICIAN ASSISTANT

## 2023-11-21 ENCOUNTER — HOSPITAL ENCOUNTER (OUTPATIENT)
Dept: MRI IMAGING | Facility: HOSPITAL | Age: 28
Discharge: HOME | End: 2023-11-21
Payer: OTHER MISCELLANEOUS

## 2023-11-21 DIAGNOSIS — T14.8XXA CRUSH INJURY: ICD-10-CM

## 2023-11-21 PROCEDURE — G1004 CDSM NDSC: HCPCS

## 2023-11-21 PROCEDURE — 73218 MRI UPPER EXTREMITY W/O DYE: CPT

## 2024-02-23 ENCOUNTER — OFFICE VISIT (OUTPATIENT)
Dept: URGENT CARE | Facility: CLINIC | Age: 29
End: 2024-02-23
Payer: COMMERCIAL

## 2024-02-23 VITALS
SYSTOLIC BLOOD PRESSURE: 136 MMHG | TEMPERATURE: 98.4 F | OXYGEN SATURATION: 97 % | WEIGHT: 210 LBS | RESPIRATION RATE: 18 BRPM | DIASTOLIC BLOOD PRESSURE: 81 MMHG | BODY MASS INDEX: 26.95 KG/M2 | HEIGHT: 74 IN | HEART RATE: 101 BPM

## 2024-02-23 DIAGNOSIS — R09.81 NASAL CONGESTION: ICD-10-CM

## 2024-02-23 DIAGNOSIS — J32.9 VIRAL SINUSITIS: Primary | ICD-10-CM

## 2024-02-23 DIAGNOSIS — B97.89 VIRAL SINUSITIS: Primary | ICD-10-CM

## 2024-02-23 PROCEDURE — 99214 OFFICE O/P EST MOD 30 MIN: CPT | Performed by: NURSE PRACTITIONER

## 2024-02-23 RX ORDER — PREDNISONE 10 MG/1
TABLET ORAL
Qty: 24 TABLET | Refills: 0 | Status: SHIPPED | OUTPATIENT
Start: 2024-02-23

## 2024-02-23 NOTE — PATIENT INSTRUCTIONS
You have been prescribed prednisone. You are to use saline nasal spray.  Follow up with your PCP in 3-5 days  Go to the ED if symptoms worsen

## 2024-02-23 NOTE — LETTER
February 23, 2024     Patient: Demario Sibley   YOB: 1995   Date of Visit: 2/23/2024       To Whom It May Concern:    It is my medical opinion that Demario Sibley may return to work on 2/24/2024 .    If you have any questions or concerns, please don't hesitate to call.         Sincerely,        MARNIE Bourgeois    CC: No Recipients

## 2024-02-23 NOTE — PROGRESS NOTES
"  [facility]        NAME: Demario Sibley is a 28 y.o. male  : 1995    MRN: [id number]  DATE: 2024  TIME: 2:42 PM    Assessment and Plan   Viral sinusitis [J32.9, B97.89]  1. Viral sinusitis  predniSONE 10 mg tablet      2. Nasal congestion  predniSONE 10 mg tablet            Patient Instructions       Follow up with PCP in 3-5 days.  Proceed to  ER if symptoms worsen.      You have been prescribed prednisone. You are to use saline nasal spray.  Follow up with your PCP in 3-5 days  Go to the ED if symptoms worsen       Chief Complaint     Chief Complaint   Patient presents with    Cold Like Symptoms     Cough, sinus congestion and pressure, headache for 1 week         History of Present Illness       This is a 28 year old male who has had sinus congestion x 1 week. Denies fevers, chills, n/v/d.  + cough.  He has been taking tylenol and nyquil for symptoms.  He states he gets sinus infections frequently because he was a wrestler and \"broke the nose and sinus bone on the right side of my face\".  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  States has runny nose.  He called PCP and couldn't get in.  He states that he has been using SNS.  States that he generally gets prednisone when he gets this.           Review of Systems   Review of Systems   Constitutional: Negative.    HENT:  Positive for congestion, postnasal drip, sinus pressure and sinus pain.    Eyes: Negative.    Respiratory:  Positive for cough.    Cardiovascular: Negative.    Gastrointestinal: Negative.    Endocrine: Negative.    Genitourinary: Negative.    Musculoskeletal: Negative.    Skin: Negative.    Allergic/Immunologic: Negative.    Neurological: Negative.    Hematological: Negative.    Psychiatric/Behavioral: Negative.           Current Medications       Current Outpatient Medications:     predniSONE 10 mg tablet, Take 5 tabs po x 2 days; 4 tabs po x 2 days; 3 tabs po x 1 day; 2 tabs po " x 1 day. 1 tab po x 1 day., Disp: 24 tablet, Rfl: 0    Acetaminophen (TYLENOL PO), Take by mouth (Patient not taking: Reported on 2/23/2024), Disp: , Rfl:     Blood Pressure Monitoring (Blood Pressure Cuff) MISC, Use in the morning (Patient not taking: Reported on 2/23/2024), Disp: 1 each, Rfl: 0    diclofenac (VOLTAREN) 75 mg EC tablet, Take 1 tablet (75 mg total) by mouth 2 (two) times a day (Patient not taking: Reported on 2/23/2024), Disp: 20 tablet, Rfl: 0    Elastic Bandages & Supports (Knee Brace/Flex Stays Large) MISC, Use in the morning Left knee brace Wear daily (Patient not taking: Reported on 2/23/2024), Disp: 1 each, Rfl: 0    fluocinolone acetonide (DermOtic) 0.01 % otic oil, Administer 5 drops into both ears 2 (two) times a day (Patient not taking: Reported on 2/23/2024), Disp: 20 mL, Rfl: 0    meclizine (ANTIVERT) 12.5 MG tablet, Take 1 tablet (12.5 mg total) by mouth 3 (three) times a day as needed for dizziness (Patient not taking: Reported on 2/23/2024), Disp: 30 tablet, Rfl: 0    ondansetron (ZOFRAN) 4 mg tablet, Take 1 tablet (4 mg total) by mouth every 8 (eight) hours as needed for nausea or vomiting (Patient not taking: Reported on 2/23/2024), Disp: 20 tablet, Rfl: 0    Phenylephrine HCl (SINEX REGULAR NA), into each nostril (Patient not taking: Reported on 2/23/2024), Disp: , Rfl:     saccharomyces boulardii (FLORASTOR) 250 mg capsule, Take 1 capsule (250 mg total) by mouth 2 (two) times a day (Patient not taking: Reported on 6/21/2023), Disp: 30 capsule, Rfl: 1    SUMAtriptan (IMITREX) 50 mg tablet, Take 1 tablet (50 mg total) by mouth once as needed for migraine for up to 1 dose may repeat in 2 hours if necessary (Patient not taking: Reported on 2/23/2024), Disp: 10 tablet, Rfl: 5    zolpidem (AMBIEN) 10 mg tablet, Take 1 tablet (10 mg total) by mouth daily at bedtime as needed for sleep (Patient not taking: Reported on 2/23/2024), Disp: 30 tablet, Rfl: 2    Current Allergies     Allergies  "as of 02/23/2024 - Reviewed 02/23/2024   Allergen Reaction Noted    Vaseline [petrolatum] Rash 05/09/2022            The following portions of the patient's history were reviewed and updated as appropriate: allergies, current medications, past family history, past medical history, past social history, past surgical history and problem list.     Past Medical History:   Diagnosis Date    Vitamin D deficiency        Past Surgical History:   Procedure Laterality Date    WISDOM TOOTH EXTRACTION  01/20/2023    [place] x 4; abscessed left lower       History reviewed. No pertinent family history.      Medications have been verified.        Objective   /81   Pulse 101   Temp 98.4 °F (36.9 °C) (Temporal)   Resp 18   Ht 6' 2\" (1.88 m)   Wt 95.3 kg (210 lb)   SpO2 97%   BMI 26.96 kg/m²   No LMP for male patient.       Physical Exam     Physical Exam  Vitals and nursing note reviewed.   Constitutional:       General: He is not in acute distress.     Appearance: Normal appearance. He is obese. He is not ill-appearing, toxic-appearing or diaphoretic.   HENT:      Head: Normocephalic and atraumatic.      Right Ear: Tympanic membrane and ear canal normal.      Left Ear: Tympanic membrane and ear canal normal.      Nose: Congestion present. No rhinorrhea.      Comments: + right nares inflamed      Mouth/Throat:      Mouth: Mucous membranes are moist.      Pharynx: No oropharyngeal exudate or posterior oropharyngeal erythema.      Comments: + PND  Continuous throat clearing and cough   Eyes:      Extraocular Movements: Extraocular movements intact.   Cardiovascular:      Rate and Rhythm: Normal rate and regular rhythm.      Pulses: Normal pulses.      Heart sounds: Normal heart sounds. No murmur heard.  Pulmonary:      Effort: Pulmonary effort is normal. No respiratory distress.      Breath sounds: Normal breath sounds. No stridor. No wheezing, rhonchi or rales.   Chest:      Chest wall: No tenderness.   Musculoskeletal:  "        General: Normal range of motion.      Cervical back: Normal range of motion and neck supple.   Skin:     General: Skin is warm and dry.      Capillary Refill: Capillary refill takes less than 2 seconds.   Neurological:      General: No focal deficit present.      Mental Status: He is alert and oriented to person, place, and time.   Psychiatric:         Mood and Affect: Mood normal.         Behavior: Behavior normal.         Thought Content: Thought content normal.         Judgment: Judgment normal.

## 2024-03-05 ENCOUNTER — OFFICE VISIT (OUTPATIENT)
Dept: FAMILY MEDICINE CLINIC | Facility: CLINIC | Age: 29
End: 2024-03-05
Payer: COMMERCIAL

## 2024-03-05 VITALS
SYSTOLIC BLOOD PRESSURE: 124 MMHG | DIASTOLIC BLOOD PRESSURE: 66 MMHG | RESPIRATION RATE: 16 BRPM | HEIGHT: 74 IN | TEMPERATURE: 98.1 F | WEIGHT: 199 LBS | BODY MASS INDEX: 25.54 KG/M2 | HEART RATE: 84 BPM

## 2024-03-05 DIAGNOSIS — Z87.81: ICD-10-CM

## 2024-03-05 DIAGNOSIS — Z98.890 HISTORY OF REDUCTION OF NASAL FRACTURE: ICD-10-CM

## 2024-03-05 DIAGNOSIS — J01.01 ACUTE RECURRENT MAXILLARY SINUSITIS: Primary | ICD-10-CM

## 2024-03-05 DIAGNOSIS — Z87.81 HISTORY OF REDUCTION OF NASAL FRACTURE: ICD-10-CM

## 2024-03-05 PROCEDURE — 99214 OFFICE O/P EST MOD 30 MIN: CPT | Performed by: FAMILY MEDICINE

## 2024-03-05 RX ORDER — CLARITHROMYCIN 500 MG/1
500 TABLET, COATED ORAL EVERY 12 HOURS SCHEDULED
Qty: 20 TABLET | Refills: 0 | Status: SHIPPED | OUTPATIENT
Start: 2024-03-05 | End: 2024-03-15

## 2024-03-05 RX ORDER — FLUTICASONE PROPIONATE 50 MCG
1 SPRAY, SUSPENSION (ML) NASAL DAILY
Qty: 3 G | Refills: 1 | Status: SHIPPED | OUTPATIENT
Start: 2024-03-05

## 2024-03-05 RX ORDER — METHYLPREDNISOLONE 4 MG/1
TABLET ORAL
Qty: 21 EACH | Refills: 0 | Status: SHIPPED | OUTPATIENT
Start: 2024-03-05

## 2024-03-05 NOTE — PROGRESS NOTES
Assessment/Plan: Patient was seen in urgent care and diagnosed with viral sinusitis February 23 was given prednisone 10 mg twice daily.  The patient states that on completion of the prednisone the symptoms returned he has sinus pain pressure thick mucus with yellow color when he blows his nose postnasal drip is also present.  Patient has been getting recurrent maxillary sinusitis for years he has a history of a nasal fracture and maxillary sinus fracture dating back to 2009.  We will supply the patient with a Medrol Dosepak and Biaxin 500 twice daily along with Flonase daily.  Patient was advised if the symptoms persist to obtain a sinus x-ray    Problem List Items Addressed This Visit    None  Visit Diagnoses     Acute recurrent maxillary sinusitis    -  Primary    Relevant Medications    clarithromycin (BIAXIN) 500 mg tablet    methylPREDNISolone 4 MG tablet therapy pack    fluticasone (FLONASE) 50 mcg/act nasal spray    Other Relevant Orders    XR sinuses < 3 vw    History of fracture of facial bone        Relevant Medications    clarithromycin (BIAXIN) 500 mg tablet    methylPREDNISolone 4 MG tablet therapy pack    fluticasone (FLONASE) 50 mcg/act nasal spray    Other Relevant Orders    XR sinuses < 3 vw    History of reduction of nasal fracture        Relevant Medications    clarithromycin (BIAXIN) 500 mg tablet    methylPREDNISolone 4 MG tablet therapy pack    fluticasone (FLONASE) 50 mcg/act nasal spray    Other Relevant Orders    XR sinuses < 3 vw             Diagnoses and all orders for this visit:    Acute recurrent maxillary sinusitis  -     XR sinuses < 3 vw; Future  -     clarithromycin (BIAXIN) 500 mg tablet; Take 1 tablet (500 mg total) by mouth every 12 (twelve) hours for 10 days  -     methylPREDNISolone 4 MG tablet therapy pack; Use as directed on package  -     fluticasone (FLONASE) 50 mcg/act nasal spray; 1 spray into each nostril daily    History of fracture of facial bone  -     XR sinuses < 3  vw; Future  -     clarithromycin (BIAXIN) 500 mg tablet; Take 1 tablet (500 mg total) by mouth every 12 (twelve) hours for 10 days  -     methylPREDNISolone 4 MG tablet therapy pack; Use as directed on package  -     fluticasone (FLONASE) 50 mcg/act nasal spray; 1 spray into each nostril daily    History of reduction of nasal fracture  -     XR sinuses < 3 vw; Future  -     clarithromycin (BIAXIN) 500 mg tablet; Take 1 tablet (500 mg total) by mouth every 12 (twelve) hours for 10 days  -     methylPREDNISolone 4 MG tablet therapy pack; Use as directed on package  -     fluticasone (FLONASE) 50 mcg/act nasal spray; 1 spray into each nostril daily        No problem-specific Assessment & Plan notes found for this encounter.      PHQ-2/9 Depression Screening            Body mass index is 25.55 kg/m².    BMI Counseling: Body mass index is 25.55 kg/m². The BMI     Subjective:      Patient ID: Demario Sibley is a 28 y.o. male.    Patient presents with recurrent maxillary sinusitis    Sinusitis  Associated symptoms include congestion, coughing, headaches and sinus pressure. Pertinent negatives include no chills, ear pain, shortness of breath or sore throat.   Wheezing  Associated symptoms include coughing, rhinorrhea and wheezing. Pertinent negatives include no chest pain, palpitations or sore throat.   Headache  Cough  Associated symptoms include headaches, postnasal drip, rhinorrhea and wheezing. Pertinent negatives include no chest pain, chills, ear pain, fever, rash, sore throat or shortness of breath.   Nausea  Associated symptoms include congestion, coughing, headaches and nausea. Pertinent negatives include no abdominal pain, arthralgias, chest pain, chills, fever, rash, sore throat or vomiting.       The following portions of the patient's history were reviewed and updated as appropriate:   He has a past medical history of Vitamin D deficiency.,  does not have a problem list on file.,   has a past surgical history  that includes [place] tooth extraction (01/20/2023).,  family history is not on file.,   reports that he has never smoked. His smokeless tobacco use includes chew. He reports current alcohol use. He reports that he does not use drugs.,  is allergic to vaseline [petrolatum]..  Current Outpatient Medications   Medication Sig Dispense Refill   • clarithromycin (BIAXIN) 500 mg tablet Take 1 tablet (500 mg total) by mouth every 12 (twelve) hours for 10 days 20 tablet 0   • fluticasone (FLONASE) 50 mcg/act nasal spray 1 spray into each nostril daily 3 g 1   • methylPREDNISolone 4 MG tablet therapy pack Use as directed on package 21 each 0   • Acetaminophen (TYLENOL PO) Take by mouth (Patient not taking: Reported on 2/23/2024)     • Blood Pressure Monitoring (Blood Pressure Cuff) MISC Use in the morning (Patient not taking: Reported on 2/23/2024) 1 each 0   • diclofenac (VOLTAREN) 75 mg EC tablet Take 1 tablet (75 mg total) by mouth 2 (two) times a day (Patient not taking: Reported on 2/23/2024) 20 tablet 0   • Elastic Bandages & Supports (Knee Brace/Flex Stays Large) MISC Use in the morning Left knee brace Wear daily (Patient not taking: Reported on 2/23/2024) 1 each 0   • fluocinolone acetonide (DermOtic) 0.01 % otic oil Administer 5 drops into both ears 2 (two) times a day (Patient not taking: Reported on 2/23/2024) 20 mL 0   • meclizine (ANTIVERT) 12.5 MG tablet Take 1 tablet (12.5 mg total) by mouth 3 (three) times a day as needed for dizziness (Patient not taking: Reported on 2/23/2024) 30 tablet 0   • ondansetron (ZOFRAN) 4 mg tablet Take 1 tablet (4 mg total) by mouth every 8 (eight) hours as needed for nausea or vomiting (Patient not taking: Reported on 2/23/2024) 20 tablet 0   • Phenylephrine HCl (SINEX REGULAR NA) into each nostril (Patient not taking: Reported on 2/23/2024)     • saccharomyces boulardii (FLORASTOR) 250 mg capsule Take 1 capsule (250 mg total) by mouth 2 (two) times a day (Patient not taking:  "Reported on 6/21/2023) 30 capsule 1   • SUMAtriptan (IMITREX) 50 mg tablet Take 1 tablet (50 mg total) by mouth once as needed for migraine for up to 1 dose may repeat in 2 hours if necessary (Patient not taking: Reported on 2/23/2024) 10 tablet 5   • zolpidem (AMBIEN) 10 mg tablet Take 1 tablet (10 mg total) by mouth daily at bedtime as needed for sleep (Patient not taking: Reported on 2/23/2024) 30 tablet 2     No current facility-administered medications for this visit.       Review of Systems   Constitutional:  Negative for chills and fever.   HENT:  Positive for congestion, postnasal drip, rhinorrhea, sinus pressure and sinus pain. Negative for ear pain and sore throat.    Eyes:  Negative for pain and visual disturbance.   Respiratory:  Positive for cough and wheezing. Negative for shortness of breath.    Cardiovascular:  Negative for chest pain and palpitations.   Gastrointestinal:  Positive for nausea. Negative for abdominal pain and vomiting.   Genitourinary:  Negative for dysuria and hematuria.   Musculoskeletal:  Negative for arthralgias and back pain.   Skin:  Negative for color change and rash.   Neurological:  Positive for headaches. Negative for seizures and syncope.   All other systems reviewed and are negative.        Objective:    /66   Pulse 84   Temp 98.1 °F (36.7 °C)   Resp 16   Ht 6' 2\" (1.88 m)   Wt 90.3 kg (199 lb)   BMI 25.55 kg/m²   Body mass index is 25.55 kg/m².     Physical Exam  Constitutional:       Appearance: Normal appearance. He is well-developed.   HENT:      Head: Normocephalic and atraumatic.      Right Ear: Tympanic membrane, ear canal and external ear normal.      Left Ear: Tympanic membrane, ear canal and external ear normal.      Nose: Congestion and rhinorrhea present.      Right Sinus: Maxillary sinus tenderness present.      Mouth/Throat:      Mouth: Mucous membranes are moist.      Pharynx: Oropharynx is clear.   Eyes:      Extraocular Movements: Extraocular " movements intact.      Conjunctiva/sclera: Conjunctivae normal.      Pupils: Pupils are equal, round, and reactive to light.   Cardiovascular:      Rate and Rhythm: Normal rate and regular rhythm.      Pulses: Normal pulses.      Heart sounds: Normal heart sounds.   Pulmonary:      Effort: Pulmonary effort is normal.      Breath sounds: Normal breath sounds.   Abdominal:      General: Abdomen is flat. Bowel sounds are normal.      Palpations: Abdomen is soft.      Tenderness: There is no abdominal tenderness.   Musculoskeletal:         General: Normal range of motion.      Cervical back: Normal range of motion and neck supple.   Skin:     General: Skin is warm and dry.      Capillary Refill: Capillary refill takes less than 2 seconds.   Neurological:      General: No focal deficit present.      Mental Status: He is alert and oriented to person, place, and time.   Psychiatric:         Mood and Affect: Mood normal.         Behavior: Behavior normal.         Thought Content: Thought content normal.         Judgment: Judgment normal.

## 2024-03-20 ENCOUNTER — OFFICE VISIT (OUTPATIENT)
Dept: URGENT CARE | Facility: CLINIC | Age: 29
End: 2024-03-20
Payer: COMMERCIAL

## 2024-03-20 VITALS
HEART RATE: 89 BPM | OXYGEN SATURATION: 98 % | TEMPERATURE: 98.5 F | SYSTOLIC BLOOD PRESSURE: 136 MMHG | DIASTOLIC BLOOD PRESSURE: 80 MMHG | RESPIRATION RATE: 18 BRPM

## 2024-03-20 DIAGNOSIS — B34.9 ACUTE VIRAL SYNDROME: Primary | ICD-10-CM

## 2024-03-20 DIAGNOSIS — R11.2 NAUSEA VOMITING AND DIARRHEA: ICD-10-CM

## 2024-03-20 DIAGNOSIS — R19.7 NAUSEA VOMITING AND DIARRHEA: ICD-10-CM

## 2024-03-20 PROCEDURE — 99214 OFFICE O/P EST MOD 30 MIN: CPT | Performed by: NURSE PRACTITIONER

## 2024-03-20 RX ORDER — ONDANSETRON 4 MG/1
4 TABLET, FILM COATED ORAL EVERY 8 HOURS PRN
Qty: 20 TABLET | Refills: 0 | Status: SHIPPED | OUTPATIENT
Start: 2024-03-20

## 2024-03-20 NOTE — LETTER
March 20, 2024     Patient: Demario Sibley   YOB: 1995   Date of Visit: 3/20/2024       To Whom It May Concern:    It is my medical opinion that Demario Sibley may return to work on 3/20/2024 .    If you have any questions or concerns, please don't hesitate to call.         Sincerely,        MARNIE Bourgeois    CC: No Recipients

## 2024-03-20 NOTE — PROGRESS NOTES
"  [facility]        NAME: Demario Sibley is a 28 y.o. male  : 1995    MRN: [id number]  DATE: 2024  TIME: 3:18 PM    Assessment and Plan   Acute viral syndrome [B34.9]  1. Acute viral syndrome  ondansetron (ZOFRAN) 4 mg tablet      2. Nausea vomiting and diarrhea  ondansetron (ZOFRAN) 4 mg tablet            Patient Instructions       Follow up with PCP in 3-5 days.  Proceed to  ER if symptoms worsen.    If tests have been performed at [facility], our office will contact you with results if changes need to be made to the care plan discussed with you at the visit.  [facility]  You have flu like symptoms.  You are to rest. Drink gatorade or pedialyte for rehydration.    You are to eat a BRAT diet - bananas, rice, applesauce and toast.  You may try imodium for diarrhea.  Take tylenol or motrin for fever or pain.   You are to mask as long as you are coughing, feverish or exhibiting symptoms.    Follow up with your PCP in 2-3 days  Go to the ED if symptoms worsen.      Take the zofran as prescribed          Chief Complaint     Chief Complaint   Patient presents with    Nausea     C/o persistent nausea, intermittent vomiting onset \"3 days ago\", pt c/o intermittent diarrhea onset \"yesterday\". Denies abdominal pain. Denies any visible blood in stool. Denies any fever. Denies any OTC medications, pt reports \"my girlfriend had Zofran and that has helped\". Denies PCP contact. Pt request note for work for today.     Vomiting    Diarrhea         History of Present Illness       Thi sis a 28 year old male who states he was visiting his father last week and he was ill with viral syndrome and pt states that for the last 3 days he has had vomiting, nausea and diarrhea.  He states he took one of his girlfriends zofran which helped a little.  He states he has not taken anything for the diarrhea. He states he did vomit and had diarrhea PTA but is requesting to return " to work tonight.   Denies fevers, chills.  PMH is listed.     Nausea  Associated symptoms include congestion, nausea and vomiting. Pertinent negatives include no abdominal pain.   Vomiting   Associated symptoms include diarrhea. Pertinent negatives include no abdominal pain.   Diarrhea   Associated symptoms include vomiting. Pertinent negatives include no abdominal pain.       Review of Systems   Review of Systems   Constitutional: Negative.    HENT:  Positive for congestion.    Eyes: Negative.    Respiratory: Negative.     Cardiovascular: Negative.    Gastrointestinal:  Positive for diarrhea, nausea and vomiting. Negative for abdominal pain.   Endocrine: Negative.    Genitourinary: Negative.    Musculoskeletal: Negative.    Skin: Negative.    Allergic/Immunologic: Negative.    Neurological: Negative.    Hematological: Negative.    Psychiatric/Behavioral: Negative.           Current Medications       Current Outpatient Medications:     ondansetron (ZOFRAN) 4 mg tablet, Take 1 tablet (4 mg total) by mouth every 8 (eight) hours as needed for nausea or vomiting, Disp: 20 tablet, Rfl: 0    Acetaminophen (TYLENOL PO), Take by mouth (Patient not taking: Reported on 2/23/2024), Disp: , Rfl:     Blood Pressure Monitoring (Blood Pressure Cuff) MISC, Use in the morning (Patient not taking: Reported on 2/23/2024), Disp: 1 each, Rfl: 0    diclofenac (VOLTAREN) 75 mg EC tablet, Take 1 tablet (75 mg total) by mouth 2 (two) times a day (Patient not taking: Reported on 2/23/2024), Disp: 20 tablet, Rfl: 0    Elastic Bandages & Supports (Knee Brace/Flex Stays Large) MISC, Use in the morning Left knee brace Wear daily (Patient not taking: Reported on 2/23/2024), Disp: 1 each, Rfl: 0    fluocinolone acetonide (DermOtic) 0.01 % otic oil, Administer 5 drops into both ears 2 (two) times a day (Patient not taking: Reported on 2/23/2024), Disp: 20 mL, Rfl: 0    fluticasone (FLONASE) 50 mcg/act nasal spray, 1 spray into each nostril daily,  Disp: 3 g, Rfl: 1    meclizine (ANTIVERT) 12.5 MG tablet, Take 1 tablet (12.5 mg total) by mouth 3 (three) times a day as needed for dizziness (Patient not taking: Reported on 2/23/2024), Disp: 30 tablet, Rfl: 0    methylPREDNISolone 4 MG tablet therapy pack, Use as directed on package, Disp: 21 each, Rfl: 0    ondansetron (ZOFRAN) 4 mg tablet, Take 1 tablet (4 mg total) by mouth every 8 (eight) hours as needed for nausea or vomiting (Patient not taking: Reported on 2/23/2024), Disp: 20 tablet, Rfl: 0    Phenylephrine HCl (SINEX REGULAR NA), into each nostril (Patient not taking: Reported on 2/23/2024), Disp: , Rfl:     saccharomyces boulardii (FLORASTOR) 250 mg capsule, Take 1 capsule (250 mg total) by mouth 2 (two) times a day (Patient not taking: Reported on 6/21/2023), Disp: 30 capsule, Rfl: 1    SUMAtriptan (IMITREX) 50 mg tablet, Take 1 tablet (50 mg total) by mouth once as needed for migraine for up to 1 dose may repeat in 2 hours if necessary (Patient not taking: Reported on 2/23/2024), Disp: 10 tablet, Rfl: 5    zolpidem (AMBIEN) 10 mg tablet, Take 1 tablet (10 mg total) by mouth daily at bedtime as needed for sleep (Patient not taking: Reported on 2/23/2024), Disp: 30 tablet, Rfl: 2    Current Allergies     Allergies as of 03/20/2024 - Reviewed 03/20/2024   Allergen Reaction Noted    Vaseline [petrolatum] Rash 05/09/2022            The following portions of the patient's history were reviewed and updated as appropriate: allergies, current medications, past family history, past medical history, past social history, past surgical history and problem list.     Past Medical History:   Diagnosis Date    Vitamin D deficiency        Past Surgical History:   Procedure Laterality Date    WISDOM TOOTH EXTRACTION  01/20/2023    [place] x 4; abscessed left lower       History reviewed. No pertinent family history.      Medications have been verified.        Objective   /80 (BP Location: Left arm, Patient  Position: Sitting, Cuff Size: Large)   Pulse 89   Temp 98.5 °F (36.9 °C) (Temporal)   Resp 18   SpO2 98%   No LMP for male patient.       Physical Exam     Physical Exam  Vitals and nursing note reviewed.   Constitutional:       General: He is not in acute distress.     Appearance: Normal appearance. He is obese. He is not ill-appearing, toxic-appearing or diaphoretic.   HENT:      Head: Normocephalic and atraumatic.      Right Ear: Tympanic membrane and ear canal normal.      Left Ear: Tympanic membrane and ear canal normal.      Nose: Nose normal. No congestion or rhinorrhea.      Mouth/Throat:      Mouth: Mucous membranes are moist.      Pharynx: No oropharyngeal exudate or posterior oropharyngeal erythema.   Eyes:      Extraocular Movements: Extraocular movements intact.   Cardiovascular:      Rate and Rhythm: Normal rate and regular rhythm.      Pulses: Normal pulses.      Heart sounds: Normal heart sounds. No murmur heard.  Pulmonary:      Effort: Pulmonary effort is normal. No respiratory distress.      Breath sounds: Normal breath sounds. No stridor. No wheezing, rhonchi or rales.   Chest:      Chest wall: No tenderness.   Abdominal:      General: Bowel sounds are normal. There is no distension.      Palpations: Abdomen is soft.      Tenderness: There is no abdominal tenderness.   Musculoskeletal:         General: Normal range of motion.      Cervical back: Normal range of motion and neck supple.   Skin:     General: Skin is warm and dry.      Capillary Refill: Capillary refill takes less than 2 seconds.   Neurological:      General: No focal deficit present.      Mental Status: He is alert and oriented to person, place, and time.   Psychiatric:         Mood and Affect: Mood normal.         Behavior: Behavior normal.         Thought Content: Thought content normal.         Judgment: Judgment normal.

## 2024-03-20 NOTE — PATIENT INSTRUCTIONS
You have flu like symptoms.  You are to rest. Drink gatorade or pedialyte for rehydration.    You are to eat a BRAT diet - bananas, rice, applesauce and toast.  You may try imodium for diarrhea.  Take tylenol or motrin for fever or pain.   You are to mask as long as you are coughing, feverish or exhibiting symptoms.    Follow up with your PCP in 2-3 days  Go to the ED if symptoms worsen.      Take the zofran as prescribed

## 2024-04-12 ENCOUNTER — OFFICE VISIT (OUTPATIENT)
Dept: URGENT CARE | Facility: CLINIC | Age: 29
End: 2024-04-12
Payer: COMMERCIAL

## 2024-04-12 VITALS
DIASTOLIC BLOOD PRESSURE: 85 MMHG | SYSTOLIC BLOOD PRESSURE: 152 MMHG | OXYGEN SATURATION: 96 % | TEMPERATURE: 97.6 F | RESPIRATION RATE: 18 BRPM | HEART RATE: 90 BPM

## 2024-04-12 DIAGNOSIS — R09.81 CHRONIC NASAL CONGESTION: Primary | ICD-10-CM

## 2024-04-12 PROCEDURE — 99213 OFFICE O/P EST LOW 20 MIN: CPT | Performed by: NURSE PRACTITIONER

## 2024-04-12 NOTE — LETTER
April 12, 2024     Patient: Demario Sibley   YOB: 1995   Date of Visit: 4/12/2024       To Whom It May Concern:    It is my medical opinion that Demario Sibley may return to work on 4/12/2024 .    If you have any questions or concerns, please don't hesitate to call.         Sincerely,        MARNIE Bourgeois    CC: No Recipients

## 2024-04-12 NOTE — PROGRESS NOTES
"  [facility]        NAME: Demario Sibley is a 28 y.o. male  : 1995    MRN: [id number]  DATE: 2024  TIME: 2:32 PM    Assessment and Plan   Chronic nasal congestion [R09.81]  1. Chronic nasal congestion              Patient Instructions       Follow up with PCP in 3-5 days.  Proceed to  ER if symptoms worsen.    If tests have been performed at [facility], our office will contact you with results if changes need to be made to the care plan discussed with you at the visit.  You can review your full results on [facility]'s MyChart.    You are to use the flonase and saline nasal spray as instructed.  Start taking your allergy medication but watch with the flonase you can dry your nose too much.  Use a cool mist humidifier in your bed room  Follow up with your PCP in 3-5 days  Go to the ED if symptoms worsen   See ENT if needed         Chief Complaint     Chief Complaint   Patient presents with    Nasal Congestion     C/o persistent nasal congestion with facial pressure onset \" night\". Pt denies fever. Pt reports seeing ENT in the past, denies recent contact.          History of Present Illness       This is a 27 year old male who states has chronic nasal congestion and stuffy nose.  He states he using the flonase and nasal spray. Has allergy medication at home but hasn't taken it.   States had a broken nose at one time and now has deviated septum and constant congestion.  Denies fevers, chills, n/v/d.   PMH is listed.         Review of Systems   Review of Systems   Constitutional: Negative.    HENT:  Positive for congestion and rhinorrhea.    Eyes: Negative.    Respiratory: Negative.     Cardiovascular: Negative.    Gastrointestinal: Negative.    Endocrine: Negative.    Genitourinary: Negative.    Musculoskeletal: Negative.    Skin: Negative.    Allergic/Immunologic: Negative.    Neurological: Negative.    Hematological: Negative.    Psychiatric/Behavioral: Negative.           Current " Medications       Current Outpatient Medications:     fluticasone (FLONASE) 50 mcg/act nasal spray, 1 spray into each nostril daily, Disp: 3 g, Rfl: 1    Acetaminophen (TYLENOL PO), Take by mouth (Patient not taking: Reported on 2/23/2024), Disp: , Rfl:     Blood Pressure Monitoring (Blood Pressure Cuff) MISC, Use in the morning (Patient not taking: Reported on 2/23/2024), Disp: 1 each, Rfl: 0    diclofenac (VOLTAREN) 75 mg EC tablet, Take 1 tablet (75 mg total) by mouth 2 (two) times a day (Patient not taking: Reported on 2/23/2024), Disp: 20 tablet, Rfl: 0    Elastic Bandages & Supports (Knee Brace/Flex Stays Large) MISC, Use in the morning Left knee brace Wear daily (Patient not taking: Reported on 2/23/2024), Disp: 1 each, Rfl: 0    fluocinolone acetonide (DermOtic) 0.01 % otic oil, Administer 5 drops into both ears 2 (two) times a day (Patient not taking: Reported on 2/23/2024), Disp: 20 mL, Rfl: 0    meclizine (ANTIVERT) 12.5 MG tablet, Take 1 tablet (12.5 mg total) by mouth 3 (three) times a day as needed for dizziness (Patient not taking: Reported on 2/23/2024), Disp: 30 tablet, Rfl: 0    methylPREDNISolone 4 MG tablet therapy pack, Use as directed on package (Patient not taking: Reported on 4/12/2024), Disp: 21 each, Rfl: 0    ondansetron (ZOFRAN) 4 mg tablet, Take 1 tablet (4 mg total) by mouth every 8 (eight) hours as needed for nausea or vomiting (Patient not taking: Reported on 2/23/2024), Disp: 20 tablet, Rfl: 0    ondansetron (ZOFRAN) 4 mg tablet, Take 1 tablet (4 mg total) by mouth every 8 (eight) hours as needed for nausea or vomiting, Disp: 20 tablet, Rfl: 0    Phenylephrine HCl (SINEX REGULAR NA), into each nostril (Patient not taking: Reported on 2/23/2024), Disp: , Rfl:     saccharomyces boulardii (FLORASTOR) 250 mg capsule, Take 1 capsule (250 mg total) by mouth 2 (two) times a day (Patient not taking: Reported on 6/21/2023), Disp: 30 capsule, Rfl: 1    SUMAtriptan (IMITREX) 50 mg tablet, Take  1 tablet (50 mg total) by mouth once as needed for migraine for up to 1 dose may repeat in 2 hours if necessary (Patient not taking: Reported on 2/23/2024), Disp: 10 tablet, Rfl: 5    zolpidem (AMBIEN) 10 mg tablet, Take 1 tablet (10 mg total) by mouth daily at bedtime as needed for sleep (Patient not taking: Reported on 2/23/2024), Disp: 30 tablet, Rfl: 2    Current Allergies     Allergies as of 04/12/2024 - Reviewed 04/12/2024   Allergen Reaction Noted    Vaseline [petrolatum] Rash 05/09/2022            The following portions of the patient's history were reviewed and updated as appropriate: allergies, current medications, past family history, past medical history, past social history, past surgical history and problem list.     Past Medical History:   Diagnosis Date    Vitamin D deficiency        Past Surgical History:   Procedure Laterality Date    WISDOM TOOTH EXTRACTION  01/20/2023    [place] x 4; abscessed left lower       History reviewed. No pertinent family history.      Medications have been verified.        Objective   /85 (BP Location: Right arm, Patient Position: Sitting, Cuff Size: Large)   Pulse 90   Temp 97.6 °F (36.4 °C) (Temporal)   Resp 18   SpO2 96%   No LMP for male patient.       Physical Exam     Physical Exam  Vitals and nursing note reviewed.   Constitutional:       General: He is not in acute distress.     Appearance: Normal appearance. He is normal weight. He is not ill-appearing, toxic-appearing or diaphoretic.   HENT:      Head: Normocephalic and atraumatic.      Right Ear: Tympanic membrane and ear canal normal.      Left Ear: Tympanic membrane and ear canal normal.      Nose: Congestion present.      Comments: Constant sniffing.  Nares B/L are not red and inflamed      Mouth/Throat:      Mouth: Mucous membranes are moist.      Pharynx: No oropharyngeal exudate or posterior oropharyngeal erythema.   Eyes:      Extraocular Movements: Extraocular movements intact.    Cardiovascular:      Rate and Rhythm: Normal rate and regular rhythm.      Pulses: Normal pulses.      Heart sounds: Normal heart sounds.   Pulmonary:      Effort: Pulmonary effort is normal.      Breath sounds: Normal breath sounds.   Musculoskeletal:         General: Normal range of motion.      Cervical back: Normal range of motion and neck supple.   Skin:     General: Skin is warm and dry.      Capillary Refill: Capillary refill takes less than 2 seconds.   Neurological:      General: No focal deficit present.      Mental Status: He is alert and oriented to person, place, and time.   Psychiatric:         Mood and Affect: Mood normal.         Behavior: Behavior normal.         Thought Content: Thought content normal.         Judgment: Judgment normal.

## 2024-04-12 NOTE — PATIENT INSTRUCTIONS
You are to use the flonase and saline nasal spray as instructed.  Start taking your allergy medication but watch with the flonase you can dry your nose too much.  Use a cool mist humidifier in your bed room  Follow up with your PCP in 3-5 days  Go to the ED if symptoms worsen   See ENT if needed

## 2024-05-01 ENCOUNTER — OFFICE VISIT (OUTPATIENT)
Dept: URGENT CARE | Facility: CLINIC | Age: 29
End: 2024-05-01
Payer: COMMERCIAL

## 2024-05-01 VITALS
SYSTOLIC BLOOD PRESSURE: 147 MMHG | RESPIRATION RATE: 18 BRPM | DIASTOLIC BLOOD PRESSURE: 88 MMHG | HEART RATE: 101 BPM | WEIGHT: 198.2 LBS | TEMPERATURE: 98.1 F | BODY MASS INDEX: 25.45 KG/M2 | OXYGEN SATURATION: 99 %

## 2024-05-01 DIAGNOSIS — K52.9 GASTROENTERITIS: Primary | ICD-10-CM

## 2024-05-01 PROCEDURE — 99213 OFFICE O/P EST LOW 20 MIN: CPT | Performed by: STUDENT IN AN ORGANIZED HEALTH CARE EDUCATION/TRAINING PROGRAM

## 2024-05-01 RX ORDER — ONDANSETRON 4 MG/1
4 TABLET, FILM COATED ORAL EVERY 8 HOURS PRN
Qty: 20 TABLET | Refills: 0 | Status: SHIPPED | OUTPATIENT
Start: 2024-05-01

## 2024-05-01 NOTE — LETTER
May 1, 2024     Patient: Demario Sibley   YOB: 1995   Date of Visit: 5/1/2024       To Whom It May Concern:    It is my medical opinion that Demario Sibley may return to work on 5/2/2024 .    If you have any questions or concerns, please don't hesitate to call.         Sincerely,        Lori Bateman,     CC: No Recipients

## 2024-05-01 NOTE — PROGRESS NOTES
[facility]        NAME: Demario Sibley is a 28 y.o. male  : 1995    MRN: [id number]  DATE: May 1, 2024  TIME: 3:28 PM    Assessment and Orders   Gastroenteritis [K52.9]  1. Gastroenteritis  ondansetron (ZOFRAN) 4 mg tablet            Plan and Discussion      Symptoms and exam consistent with gastroenteritis, likely due to undercooked meat at the cookout. Will treat with Zofran. Strict ED precautions given. Abdominal exam normal.     Risks and benefits discussed. Patient understands and agrees with the plan.     PATIENT INSTRUCTIONS        If tests have been performed at [facility], our office will contact you with results if changes need to be made to the care plan discussed with you at the visit.  [facility]    Follow up with PCP.     If any of the following occur, please report to your nearest ED for evaluation or call 911.   Difficultly breathing or shortness of breath  Chest pain  Acutely worsening symptoms.         Chief Complaint     Chief Complaint   Patient presents with    Vomiting     And nausea not sure if ate bad food on weekend needs work note          History of Present Illness       Started on  after a cookout.   Normal urination.     Vomiting   This is a new problem. The current episode started in the past 7 days. The problem occurs less than 2 times per day. The problem has been gradually improving. The emesis has an appearance of stomach contents. There has been no fever. Associated symptoms include abdominal pain (vanessa night, but this has resolved). Pertinent negatives include no diarrhea, dizziness, fever or headaches. Risk factors include suspect food intake.       Review of Systems   Review of Systems   Constitutional:  Negative for fever.   Gastrointestinal:  Positive for abdominal pain ( night, but this has resolved) and vomiting. Negative for diarrhea.   Neurological:  Negative for dizziness and headaches.          Current Medications       Current Outpatient Medications:     ondansetron (ZOFRAN) 4 mg tablet, Take 1 tablet (4 mg total) by mouth every 8 (eight) hours as needed for nausea or vomiting, Disp: 20 tablet, Rfl: 0    Acetaminophen (TYLENOL PO), Take by mouth (Patient not taking: Reported on 2/23/2024), Disp: , Rfl:     Blood Pressure Monitoring (Blood Pressure Cuff) MISC, Use in the morning (Patient not taking: Reported on 2/23/2024), Disp: 1 each, Rfl: 0    diclofenac (VOLTAREN) 75 mg EC tablet, Take 1 tablet (75 mg total) by mouth 2 (two) times a day (Patient not taking: Reported on 2/23/2024), Disp: 20 tablet, Rfl: 0    Elastic Bandages & Supports (Knee Brace/Flex Stays Large) MISC, Use in the morning Left knee brace Wear daily (Patient not taking: Reported on 2/23/2024), Disp: 1 each, Rfl: 0    fluocinolone acetonide (DermOtic) 0.01 % otic oil, Administer 5 drops into both ears 2 (two) times a day (Patient not taking: Reported on 2/23/2024), Disp: 20 mL, Rfl: 0    fluticasone (FLONASE) 50 mcg/act nasal spray, 1 spray into each nostril daily, Disp: 3 g, Rfl: 1    meclizine (ANTIVERT) 12.5 MG tablet, Take 1 tablet (12.5 mg total) by mouth 3 (three) times a day as needed for dizziness (Patient not taking: Reported on 2/23/2024), Disp: 30 tablet, Rfl: 0    methylPREDNISolone 4 MG tablet therapy pack, Use as directed on package (Patient not taking: Reported on 4/12/2024), Disp: 21 each, Rfl: 0    ondansetron (ZOFRAN) 4 mg tablet, Take 1 tablet (4 mg total) by mouth every 8 (eight) hours as needed for nausea or vomiting (Patient not taking: Reported on 2/23/2024), Disp: 20 tablet, Rfl: 0    ondansetron (ZOFRAN) 4 mg tablet, Take 1 tablet (4 mg total) by mouth every 8 (eight) hours as needed for nausea or vomiting, Disp: 20 tablet, Rfl: 0    Phenylephrine HCl (SINEX REGULAR NA), into each nostril (Patient not taking: Reported on 2/23/2024), Disp: , Rfl:     saccharomyces boulardii (FLORASTOR) 250 mg capsule, Take  1 capsule (250 mg total) by mouth 2 (two) times a day (Patient not taking: Reported on 6/21/2023), Disp: 30 capsule, Rfl: 1    SUMAtriptan (IMITREX) 50 mg tablet, Take 1 tablet (50 mg total) by mouth once as needed for migraine for up to 1 dose may repeat in 2 hours if necessary (Patient not taking: Reported on 2/23/2024), Disp: 10 tablet, Rfl: 5    zolpidem (AMBIEN) 10 mg tablet, Take 1 tablet (10 mg total) by mouth daily at bedtime as needed for sleep (Patient not taking: Reported on 2/23/2024), Disp: 30 tablet, Rfl: 2    Current Allergies     Allergies as of 05/01/2024 - Reviewed 05/01/2024   Allergen Reaction Noted    Vaseline [petrolatum] Rash 05/09/2022            The following portions of the patient's history were reviewed and updated as appropriate: allergies, current medications, past family history, past medical history, past social history, past surgical history and problem list.     Past Medical History:   Diagnosis Date    Vitamin D deficiency        Past Surgical History:   Procedure Laterality Date    WISDOM TOOTH EXTRACTION  01/20/2023    [place] x 4; abscessed left lower       No family history on file.      Medications have been verified.        Objective   /88   Pulse 101   Temp 98.1 °F (36.7 °C)   Resp 18   Wt 89.9 kg (198 lb 3.2 oz)   SpO2 99%   BMI 25.45 kg/m²   No LMP for male patient.       Physical Exam     Physical Exam  Constitutional:       General: He is not in acute distress.     Appearance: He is not ill-appearing.   Pulmonary:      Effort: Pulmonary effort is normal. No respiratory distress.   Abdominal:      General: There is no distension.      Tenderness: There is no abdominal tenderness. There is no right CVA tenderness, left CVA tenderness, guarding or rebound.   Skin:     General: Skin is warm.      Findings: No erythema or rash.   Neurological:      General: No focal deficit present.      Mental Status: He is alert and oriented to person, place, and time.    Psychiatric:         Mood and Affect: Mood normal.         Behavior: Behavior normal.               Lori Bateman DO

## 2024-06-05 ENCOUNTER — OFFICE VISIT (OUTPATIENT)
Dept: URGENT CARE | Facility: CLINIC | Age: 29
End: 2024-06-05
Payer: COMMERCIAL

## 2024-06-05 VITALS
DIASTOLIC BLOOD PRESSURE: 80 MMHG | BODY MASS INDEX: 25.68 KG/M2 | RESPIRATION RATE: 16 BRPM | HEART RATE: 90 BPM | WEIGHT: 200 LBS | SYSTOLIC BLOOD PRESSURE: 130 MMHG | TEMPERATURE: 99.2 F | OXYGEN SATURATION: 99 %

## 2024-06-05 DIAGNOSIS — K21.9 GASTROESOPHAGEAL REFLUX DISEASE, UNSPECIFIED WHETHER ESOPHAGITIS PRESENT: Primary | ICD-10-CM

## 2024-06-05 DIAGNOSIS — R11.2 NAUSEA AND VOMITING, UNSPECIFIED VOMITING TYPE: ICD-10-CM

## 2024-06-05 PROCEDURE — S9083 URGENT CARE CENTER GLOBAL: HCPCS | Performed by: NURSE PRACTITIONER

## 2024-06-05 PROCEDURE — G0383 LEV 4 HOSP TYPE B ED VISIT: HCPCS | Performed by: NURSE PRACTITIONER

## 2024-06-05 RX ORDER — OMEPRAZOLE 20 MG/1
20 CAPSULE, DELAYED RELEASE ORAL DAILY
Qty: 30 CAPSULE | Refills: 0 | Status: SHIPPED | OUTPATIENT
Start: 2024-06-05 | End: 2024-07-05

## 2024-06-05 NOTE — LETTER
June 5, 2024     Patient: Demario Sibley   YOB: 1995   Date of Visit: 6/5/2024       To Whom It May Concern:    It is my medical opinion that Demario Sibley may return to work on 6/5/2024 .    If you have any questions or concerns, please don't hesitate to call.         Sincerely,        MARNIE Bourgeois    CC: No Recipients

## 2024-06-05 NOTE — PATIENT INSTRUCTIONS
You have been prescribed omeprazole - take as prescribed.   You should follow a GERD/Gastritis diet.   No soda, alcohol. Citrus, caffeine.   You have been given a referral for Gastroenterology - call for appointment  Follow up with your PCP in 3-5 days  Go to the ED if symptoms worsen

## 2024-06-05 NOTE — PROGRESS NOTES
"  [facility]        NAME: Demario Sibley is a 29 y.o. male  : 1995    MRN: [id number]  DATE: 2024  TIME: 1:40 PM    Assessment and Plan   Gastroesophageal reflux disease, unspecified whether esophagitis present [K21.9]  1. Gastroesophageal reflux disease, unspecified whether esophagitis present  omeprazole (PriLOSEC) 20 mg delayed release capsule    Ambulatory Referral to Gastroenterology      2. Nausea and vomiting, unspecified vomiting type  omeprazole (PriLOSEC) 20 mg delayed release capsule    Ambulatory Referral to Gastroenterology            Patient Instructions       Follow up with PCP in 3-5 days.  Proceed to  ER if symptoms worsen.    If tests have been performed at [facility], our office will contact you with results if changes need to be made to the care plan discussed with you at the visit.  [facility]  You have been prescribed omeprazole - take as prescribed.   You should follow a GERD/Gastritis diet.   No soda, alcohol. Citrus, caffeine.   You have been given a referral for Gastroenterology - call for appointment  Follow up with your PCP in 3-5 days  Go to the ED if symptoms worsen           Chief Complaint     Chief Complaint   Patient presents with    Vomiting     X 2 days          History of Present Illness       This is a 29 year old male who has been to [facility] for nausea, vomiting several times since 2023. He states he does seem to have \"reflux really bad\". He denies that he has seen GI or PCP for this.  He states that for the last 2 days has had nausea and vomiting and does seem to be doing better and needs a work note to return. He states that he has a bad diet and eats a lot of greasy food.  States had stomach issues when younger but does not have any information on that.  PMH is listed. Denies ETOH, marijuana use, hematemesis or diarrhea.          Review of Systems   Review of Systems   Constitutional: Negative.  "   HENT: Negative.     Eyes: Negative.    Respiratory: Negative.     Cardiovascular: Negative.    Gastrointestinal:  Positive for nausea and vomiting. Negative for abdominal pain and diarrhea.   Endocrine: Negative.    Genitourinary: Negative.    Musculoskeletal: Negative.    Skin: Negative.    Allergic/Immunologic: Negative.    Neurological: Negative.    Hematological: Negative.    Psychiatric/Behavioral: Negative.           Current Medications       Current Outpatient Medications:     omeprazole (PriLOSEC) 20 mg delayed release capsule, Take 1 capsule (20 mg total) by mouth daily, Disp: 30 capsule, Rfl: 0    Acetaminophen (TYLENOL PO), Take by mouth (Patient not taking: Reported on 2/23/2024), Disp: , Rfl:     Blood Pressure Monitoring (Blood Pressure Cuff) MISC, Use in the morning (Patient not taking: Reported on 2/23/2024), Disp: 1 each, Rfl: 0    diclofenac (VOLTAREN) 75 mg EC tablet, Take 1 tablet (75 mg total) by mouth 2 (two) times a day (Patient not taking: Reported on 2/23/2024), Disp: 20 tablet, Rfl: 0    Elastic Bandages & Supports (Knee Brace/Flex Stays Large) MISC, Use in the morning Left knee brace Wear daily (Patient not taking: Reported on 2/23/2024), Disp: 1 each, Rfl: 0    fluocinolone acetonide (DermOtic) 0.01 % otic oil, Administer 5 drops into both ears 2 (two) times a day (Patient not taking: Reported on 2/23/2024), Disp: 20 mL, Rfl: 0    fluticasone (FLONASE) 50 mcg/act nasal spray, 1 spray into each nostril daily, Disp: 3 g, Rfl: 1    meclizine (ANTIVERT) 12.5 MG tablet, Take 1 tablet (12.5 mg total) by mouth 3 (three) times a day as needed for dizziness (Patient not taking: Reported on 2/23/2024), Disp: 30 tablet, Rfl: 0    methylPREDNISolone 4 MG tablet therapy pack, Use as directed on package (Patient not taking: Reported on 4/12/2024), Disp: 21 each, Rfl: 0    ondansetron (ZOFRAN) 4 mg tablet, Take 1 tablet (4 mg total) by mouth every 8 (eight) hours as needed for nausea or vomiting  (Patient not taking: Reported on 2/23/2024), Disp: 20 tablet, Rfl: 0    ondansetron (ZOFRAN) 4 mg tablet, Take 1 tablet (4 mg total) by mouth every 8 (eight) hours as needed for nausea or vomiting, Disp: 20 tablet, Rfl: 0    ondansetron (ZOFRAN) 4 mg tablet, Take 1 tablet (4 mg total) by mouth every 8 (eight) hours as needed for nausea or vomiting, Disp: 20 tablet, Rfl: 0    Phenylephrine HCl (SINEX REGULAR NA), into each nostril (Patient not taking: Reported on 2/23/2024), Disp: , Rfl:     saccharomyces boulardii (FLORASTOR) 250 mg capsule, Take 1 capsule (250 mg total) by mouth 2 (two) times a day (Patient not taking: Reported on 6/21/2023), Disp: 30 capsule, Rfl: 1    SUMAtriptan (IMITREX) 50 mg tablet, Take 1 tablet (50 mg total) by mouth once as needed for migraine for up to 1 dose may repeat in 2 hours if necessary (Patient not taking: Reported on 2/23/2024), Disp: 10 tablet, Rfl: 5    zolpidem (AMBIEN) 10 mg tablet, Take 1 tablet (10 mg total) by mouth daily at bedtime as needed for sleep (Patient not taking: Reported on 2/23/2024), Disp: 30 tablet, Rfl: 2    Current Allergies     Allergies as of 06/05/2024 - Reviewed 06/05/2024   Allergen Reaction Noted    Vaseline [petrolatum] Rash 05/09/2022            The following portions of the patient's history were reviewed and updated as appropriate: allergies, current medications, past family history, past medical history, past social history, past surgical history and problem list.     Past Medical History:   Diagnosis Date    Vitamin D deficiency        Past Surgical History:   Procedure Laterality Date    WISDOM TOOTH EXTRACTION  01/20/2023    [place] x 4; abscessed left lower       History reviewed. No pertinent family history.      Medications have been verified.        Objective   /80   Pulse 90   Temp 99.2 °F (37.3 °C)   Resp 16   Wt 90.7 kg (200 lb)   SpO2 99%   BMI 25.68 kg/m²   No LMP for male patient.       Physical Exam     Physical  Exam  Vitals and nursing note reviewed.   Constitutional:       General: He is not in acute distress.     Appearance: He is normal weight. He is not ill-appearing, toxic-appearing or diaphoretic.   HENT:      Head: Normocephalic and atraumatic.   Eyes:      Extraocular Movements: Extraocular movements intact.   Cardiovascular:      Rate and Rhythm: Normal rate and regular rhythm.      Pulses: Normal pulses.      Heart sounds: Normal heart sounds. No murmur heard.  Pulmonary:      Effort: Pulmonary effort is normal. No respiratory distress.      Breath sounds: Normal breath sounds. No stridor. No wheezing, rhonchi or rales.   Chest:      Chest wall: No tenderness.   Abdominal:      General: Bowel sounds are normal. There is no distension.      Tenderness: There is no abdominal tenderness.   Musculoskeletal:         General: Normal range of motion.      Cervical back: Normal range of motion.   Skin:     General: Skin is warm and dry.      Capillary Refill: Capillary refill takes less than 2 seconds.   Neurological:      General: No focal deficit present.      Mental Status: He is alert and oriented to person, place, and time.   Psychiatric:         Mood and Affect: Mood normal.         Behavior: Behavior normal.         Thought Content: Thought content normal.         Judgment: Judgment normal.

## 2024-09-18 ENCOUNTER — OFFICE VISIT (OUTPATIENT)
Dept: URGENT CARE | Facility: CLINIC | Age: 29
End: 2024-09-18
Payer: COMMERCIAL

## 2024-09-18 VITALS
SYSTOLIC BLOOD PRESSURE: 138 MMHG | DIASTOLIC BLOOD PRESSURE: 82 MMHG | OXYGEN SATURATION: 98 % | RESPIRATION RATE: 20 BRPM | HEART RATE: 107 BPM | TEMPERATURE: 99 F

## 2024-09-18 DIAGNOSIS — H92.01 ACUTE OTALGIA, RIGHT: ICD-10-CM

## 2024-09-18 DIAGNOSIS — J06.9 URI WITH COUGH AND CONGESTION: Primary | ICD-10-CM

## 2024-09-18 PROCEDURE — G0382 LEV 3 HOSP TYPE B ED VISIT: HCPCS | Performed by: NURSE PRACTITIONER

## 2024-09-18 PROCEDURE — S9083 URGENT CARE CENTER GLOBAL: HCPCS | Performed by: NURSE PRACTITIONER

## 2024-09-18 NOTE — LETTER
September 18, 2024     Patient: Demario Sibley   YOB: 1995   Date of Visit: 9/18/2024       To Whom It May Concern:    It is my medical opinion that Demario Sibley may return to work on 9/18/2024 .    If you have any questions or concerns, please don't hesitate to call.         Sincerely,        MARNIE Bourgeois    CC: No Recipients

## 2024-09-18 NOTE — PROGRESS NOTES
[facility]        NAME: Demario Sibley is a 29 y.o. male  : 1995    MRN: [id number]  DATE: 2024  TIME: 2:44 PM    Assessment and Plan   URI with cough and congestion [J06.9]  1. URI with cough and congestion        2. Acute otalgia, right              Patient Instructions       Follow up with PCP in 3-5 days.  Proceed to  ER if symptoms worsen.    If tests have been performed at [facility], our office will contact you with results if changes need to be made to the care plan discussed with you at the visit.  [facility]    You appear to have cold symptoms.  You are to use Flonase nasal spray for nasal congestion; it is over the counter.    You may try sudafed and Claritin. This is a decongestant and the Claritin will dry up secretions.   Take an OTC cough relief product if having a cough.    Use a cool mist humidifier.    If you develop post nasal drip and a sore throat - perform warm salt water gargles 4 x daily.  Drink plenty of water.  Take vitamin C. Avoid citrus juice and soda- this can cause throat pain to worsen.     Take tylenol or motrin if needed for fevers or pain.    You may always speak with the pharmacist and see what is available over the counter for your symptoms.  Follow up with your PCP in 3-5 days  Go to the ED if symptoms worsen      Chief Complaint     Chief Complaint   Patient presents with    Headache    sinus pressure     Cough    Earache         History of Present Illness       This is a 29 year old male who states woke up today with blood in his pillow from his right ear. He states he is congested with cough, sinus pressure since this am.  Denies taking anything for his symptoms.  He denies fevers, chills, n/v/d.  He states he has some sinus issues.  States needs a work note. PMH is listed.   Pt states he does wear ear plugs at work.     Headache  Cough  Associated symptoms include ear pain and headaches.   Earache    Associated symptoms include coughing, ear discharge and headaches.       Review of Systems   Review of Systems   Constitutional: Negative.    HENT:  Positive for congestion, ear discharge, ear pain, sinus pressure and sinus pain.    Eyes: Negative.    Respiratory:  Positive for cough.    Cardiovascular: Negative.    Gastrointestinal: Negative.    Endocrine: Negative.    Genitourinary: Negative.    Musculoskeletal: Negative.    Skin: Negative.    Allergic/Immunologic: Negative.    Neurological:  Positive for headaches.   Hematological: Negative.    Psychiatric/Behavioral: Negative.           Current Medications       Current Outpatient Medications:     Acetaminophen (TYLENOL PO), Take by mouth (Patient not taking: Reported on 2/23/2024), Disp: , Rfl:     Blood Pressure Monitoring (Blood Pressure Cuff) MISC, Use in the morning (Patient not taking: Reported on 2/23/2024), Disp: 1 each, Rfl: 0    diclofenac (VOLTAREN) 75 mg EC tablet, Take 1 tablet (75 mg total) by mouth 2 (two) times a day (Patient not taking: Reported on 2/23/2024), Disp: 20 tablet, Rfl: 0    Elastic Bandages & Supports (Knee Brace/Flex Stays Large) MISC, Use in the morning Left knee brace Wear daily (Patient not taking: Reported on 2/23/2024), Disp: 1 each, Rfl: 0    fluocinolone acetonide (DermOtic) 0.01 % otic oil, Administer 5 drops into both ears 2 (two) times a day (Patient not taking: Reported on 2/23/2024), Disp: 20 mL, Rfl: 0    fluticasone (FLONASE) 50 mcg/act nasal spray, 1 spray into each nostril daily (Patient not taking: Reported on 9/18/2024), Disp: 3 g, Rfl: 1    meclizine (ANTIVERT) 12.5 MG tablet, Take 1 tablet (12.5 mg total) by mouth 3 (three) times a day as needed for dizziness (Patient not taking: Reported on 2/23/2024), Disp: 30 tablet, Rfl: 0    methylPREDNISolone 4 MG tablet therapy pack, Use as directed on package (Patient not taking: Reported on 4/12/2024), Disp: 21 each, Rfl: 0    omeprazole (PriLOSEC) 20 mg delayed  release capsule, Take 1 capsule (20 mg total) by mouth daily, Disp: 30 capsule, Rfl: 0    ondansetron (ZOFRAN) 4 mg tablet, Take 1 tablet (4 mg total) by mouth every 8 (eight) hours as needed for nausea or vomiting (Patient not taking: Reported on 2/23/2024), Disp: 20 tablet, Rfl: 0    ondansetron (ZOFRAN) 4 mg tablet, Take 1 tablet (4 mg total) by mouth every 8 (eight) hours as needed for nausea or vomiting (Patient not taking: Reported on 9/18/2024), Disp: 20 tablet, Rfl: 0    ondansetron (ZOFRAN) 4 mg tablet, Take 1 tablet (4 mg total) by mouth every 8 (eight) hours as needed for nausea or vomiting (Patient not taking: Reported on 9/18/2024), Disp: 20 tablet, Rfl: 0    Phenylephrine HCl (SINEX REGULAR NA), into each nostril (Patient not taking: Reported on 2/23/2024), Disp: , Rfl:     saccharomyces boulardii (FLORASTOR) 250 mg capsule, Take 1 capsule (250 mg total) by mouth 2 (two) times a day (Patient not taking: Reported on 6/21/2023), Disp: 30 capsule, Rfl: 1    SUMAtriptan (IMITREX) 50 mg tablet, Take 1 tablet (50 mg total) by mouth once as needed for migraine for up to 1 dose may repeat in 2 hours if necessary (Patient not taking: Reported on 2/23/2024), Disp: 10 tablet, Rfl: 5    zolpidem (AMBIEN) 10 mg tablet, Take 1 tablet (10 mg total) by mouth daily at bedtime as needed for sleep (Patient not taking: Reported on 2/23/2024), Disp: 30 tablet, Rfl: 2    Current Allergies     Allergies as of 09/18/2024 - Reviewed 06/05/2024   Allergen Reaction Noted    Vaseline [petrolatum] Rash 05/09/2022            The following portions of the patient's history were reviewed and updated as appropriate: allergies, current medications, past family history, past medical history, past social history, past surgical history and problem list.     Past Medical History:   Diagnosis Date    Vitamin D deficiency        Past Surgical History:   Procedure Laterality Date    WISDOM TOOTH EXTRACTION  01/20/2023    [place] x 4; abscessed  left lower       History reviewed. No pertinent family history.      Medications have been verified.        Objective   /82   Pulse (!) 107   Temp 99 °F (37.2 °C)   Resp 20   SpO2 98%   No LMP for male patient.       Physical Exam     Physical Exam  Vitals and nursing note reviewed.   Constitutional:       General: He is not in acute distress.     Appearance: Normal appearance. He is normal weight. He is not ill-appearing, toxic-appearing or diaphoretic.   HENT:      Head: Normocephalic and atraumatic.      Right Ear: Tympanic membrane normal.      Left Ear: Tympanic membrane and ear canal normal.      Ears:      Comments: Right ear canal with very mild redness at base of TM   No visible blood      Nose: No congestion or rhinorrhea.      Comments: Nasal turbinates pale and boggy   Constant sniffing.       Mouth/Throat:      Mouth: Mucous membranes are moist.      Pharynx: No oropharyngeal exudate or posterior oropharyngeal erythema.   Eyes:      Extraocular Movements: Extraocular movements intact.   Cardiovascular:      Rate and Rhythm: Normal rate and regular rhythm.      Pulses: Normal pulses.      Heart sounds: Normal heart sounds. No murmur heard.  Pulmonary:      Effort: Pulmonary effort is normal. No respiratory distress.      Breath sounds: Normal breath sounds. No stridor. No wheezing, rhonchi or rales.   Chest:      Chest wall: No tenderness.   Musculoskeletal:         General: Normal range of motion.      Cervical back: Normal range of motion and neck supple.   Skin:     General: Skin is warm and dry.      Capillary Refill: Capillary refill takes less than 2 seconds.   Neurological:      General: No focal deficit present.      Mental Status: He is alert and oriented to person, place, and time.   Psychiatric:         Mood and Affect: Mood normal.         Behavior: Behavior normal.         Thought Content: Thought content normal.         Judgment: Judgment normal.

## 2024-09-18 NOTE — PATIENT INSTRUCTIONS
You appear to have cold symptoms.  You are to use Flonase nasal spray for nasal congestion; it is over the counter.    You may try sudafed and Claritin. This is a decongestant and the Claritin will dry up secretions.   Take an OTC cough relief product if having a cough.    Use a cool mist humidifier.    If you develop post nasal drip and a sore throat - perform warm salt water gargles 4 x daily.  Drink plenty of water.  Take vitamin C. Avoid citrus juice and soda- this can cause throat pain to worsen.     Take tylenol or motrin if needed for fevers or pain.    You may always speak with the pharmacist and see what is available over the counter for your symptoms.  Follow up with your PCP in 3-5 days  Go to the ED if symptoms worsen

## 2024-11-21 ENCOUNTER — OFFICE VISIT (OUTPATIENT)
Dept: FAMILY MEDICINE CLINIC | Facility: CLINIC | Age: 29
End: 2024-11-21
Payer: COMMERCIAL

## 2024-11-21 VITALS
DIASTOLIC BLOOD PRESSURE: 80 MMHG | SYSTOLIC BLOOD PRESSURE: 134 MMHG | RESPIRATION RATE: 16 BRPM | HEIGHT: 74 IN | WEIGHT: 202 LBS | BODY MASS INDEX: 25.93 KG/M2 | TEMPERATURE: 97.9 F | HEART RATE: 76 BPM

## 2024-11-21 DIAGNOSIS — H66.93 OTITIS OF BOTH EARS: ICD-10-CM

## 2024-11-21 DIAGNOSIS — J01.01 ACUTE RECURRENT MAXILLARY SINUSITIS: Primary | ICD-10-CM

## 2024-11-21 DIAGNOSIS — J40 BRONCHITIS: ICD-10-CM

## 2024-11-21 PROCEDURE — 99214 OFFICE O/P EST MOD 30 MIN: CPT | Performed by: FAMILY MEDICINE

## 2024-11-21 RX ORDER — CEFDINIR 300 MG/1
300 CAPSULE ORAL EVERY 12 HOURS SCHEDULED
Qty: 20 CAPSULE | Refills: 0 | Status: SHIPPED | OUTPATIENT
Start: 2024-11-21 | End: 2024-12-01

## 2024-11-21 RX ORDER — DEXTROMETHORPHAN HYDROBROMIDE AND PROMETHAZINE HYDROCHLORIDE 15; 6.25 MG/5ML; MG/5ML
5 SYRUP ORAL 4 TIMES DAILY PRN
Qty: 180 ML | Refills: 0 | Status: SHIPPED | OUTPATIENT
Start: 2024-11-21

## 2024-11-21 RX ORDER — METHYLPREDNISOLONE 4 MG/1
TABLET ORAL
Qty: 21 EACH | Refills: 0 | Status: SHIPPED | OUTPATIENT
Start: 2024-11-21

## 2024-11-21 NOTE — PROGRESS NOTES
Name: Demario Sibley      : 1995      MRN: [id number]  Encounter Provider: Estuardo Okeefe DO  Encounter Date: 2024   Encounter department: [facility]  :  Assessment & Plan  Acute recurrent maxillary sinusitis  We will provide a Medrol Dosepak and Omnicef 300 twice daily       Bronchitis  We will provide Promethazine DM with symptomatic control       Otitis of both ears               Depression Screening and Follow-up Plan: Patient was screened for depression during today's encounter. They screened negative with a PHQ-2 score of 0.    Tobacco Cessation Counseling: Tobacco cessation counseling was not provided. The patient is sincerely urged to quit consumption of tobacco. He is not ready to quit tobacco.       History of Present Illness     Patient presents with headache cough and bilateral earaches    Sinus Problem  Associated symptoms include coughing, ear pain and headaches. Pertinent negatives include no chills, shortness of breath or sore throat.   Fever  Associated symptoms include coughing and headaches. Pertinent negatives include no abdominal pain, arthralgias, chest pain, chills, fever, rash, sore throat or vomiting.   Cough  Associated symptoms include ear pain and headaches. Pertinent negatives include no chest pain, chills, fever, rash, sore throat or shortness of breath.   Earache   Associated symptoms include coughing and headaches. Pertinent negatives include no abdominal pain, rash, sore throat or vomiting.   Headache    Review of Systems   Constitutional:  Negative for chills and fever.   HENT:  Positive for ear pain. Negative for sore throat.    Eyes:  Negative for pain and visual disturbance.   Respiratory:  Positive for cough. Negative for shortness of breath.    Cardiovascular:  Negative for chest pain and palpitations.   Gastrointestinal:  Negative for abdominal pain and vomiting.   Genitourinary:  Negative for dysuria and hematuria.  "  Musculoskeletal:  Negative for arthralgias and back pain.   Skin:  Negative for color change and rash.   Neurological:  Positive for headaches. Negative for seizures and syncope.   All other systems reviewed and are negative.         Objective   /80   Pulse 76   Temp 97.9 °F (36.6 °C)   Resp 16   Ht 6' 2\" (1.88 m)   Wt 91.6 kg (202 lb)   BMI 25.94 kg/m²      Physical Exam  Constitutional:       Appearance: Normal appearance.   HENT:      Head: Normocephalic and atraumatic.      Right Ear: Ear canal and external ear normal. Tympanic membrane is erythematous and bulging.      Left Ear: Ear canal and external ear normal. Tympanic membrane is erythematous and bulging.      Nose:      Right Sinus: Maxillary sinus tenderness present.      Left Sinus: Maxillary sinus tenderness present.      Mouth/Throat:      Mouth: Mucous membranes are moist.      Pharynx: Oropharynx is clear.   Eyes:      Extraocular Movements: Extraocular movements intact.      Conjunctiva/sclera: Conjunctivae normal.      Pupils: Pupils are equal, round, and reactive to light.   Cardiovascular:      Rate and Rhythm: Normal rate and regular rhythm.      Pulses: Normal pulses.      Heart sounds: Normal heart sounds.   Pulmonary:      Effort: Pulmonary effort is normal.      Breath sounds: Normal breath sounds.   Abdominal:      General: Abdomen is flat. Bowel sounds are normal.      Palpations: Abdomen is soft.   Musculoskeletal:         General: Normal range of motion.      Cervical back: Normal range of motion.   Skin:     General: Skin is warm and dry.      Capillary Refill: Capillary refill takes less than 2 seconds.   Neurological:      General: No focal deficit present.      Mental Status: He is alert and oriented to person, place, and time.   Psychiatric:         Mood and Affect: Mood normal.         Behavior: Behavior normal.         "

## 2024-11-21 NOTE — LETTER
November 21, 2024     Patient: Demario Sibley  YOB: 1995  Date of Visit: 11/21/2024      To Whom it May Concern:    Demario Sibley is under my professional care. Demario was seen in my office on 11/21/2024.  Please excuse him from work 11/20/2024-11/22/2024    If you have any questions or concerns, please don't hesitate to call.         Sincerely,          Dr. Estuardo Okeefe, DO

## 2025-02-02 ENCOUNTER — OFFICE VISIT (OUTPATIENT)
Dept: URGENT CARE | Facility: MEDICAL CENTER | Age: 30
End: 2025-02-02
Payer: COMMERCIAL

## 2025-02-02 VITALS
WEIGHT: 205 LBS | TEMPERATURE: 98.8 F | HEIGHT: 75 IN | HEART RATE: 122 BPM | BODY MASS INDEX: 25.49 KG/M2 | RESPIRATION RATE: 22 BRPM | SYSTOLIC BLOOD PRESSURE: 132 MMHG | DIASTOLIC BLOOD PRESSURE: 88 MMHG | OXYGEN SATURATION: 98 %

## 2025-02-02 DIAGNOSIS — J02.9 ACUTE PHARYNGITIS, UNSPECIFIED ETIOLOGY: ICD-10-CM

## 2025-02-02 DIAGNOSIS — J01.90 ACUTE NON-RECURRENT SINUSITIS, UNSPECIFIED LOCATION: Primary | ICD-10-CM

## 2025-02-02 DIAGNOSIS — J02.9 SORE THROAT: ICD-10-CM

## 2025-02-02 LAB — S PYO AG THROAT QL: NEGATIVE

## 2025-02-02 PROCEDURE — G0381 LEV 2 HOSP TYPE B ED VISIT: HCPCS | Performed by: PHYSICIAN ASSISTANT

## 2025-02-02 PROCEDURE — S9083 URGENT CARE CENTER GLOBAL: HCPCS | Performed by: PHYSICIAN ASSISTANT

## 2025-02-02 PROCEDURE — 87880 STREP A ASSAY W/OPTIC: CPT | Performed by: PHYSICIAN ASSISTANT

## 2025-02-02 NOTE — LETTER
February 2, 2025     Patient: Demario Sibley   YOB: 1995   Date of Visit: 2/2/2025       To Whom It May Concern:    It is my medical opinion that Demario Sibley may return to work on 02/04/25 .    If you have any questions or concerns, please don't hesitate to call.         Sincerely,        Kamini Rodriguez PA-C    CC: No Recipients

## 2025-02-02 NOTE — PROGRESS NOTES
[facility]        NAME: Demario Sibley is a 29 y.o. male  : 1995    MRN: [id number]  DATE: 2025  TIME: 12:52 PM    Assessment and Plan   Acute non-recurrent sinusitis, unspecified location [J01.90]  1. Acute non-recurrent sinusitis, unspecified location  amoxicillin-clavulanate (AUGMENTIN) 875-125 mg per tablet      2. Sore throat  POCT rapid ANTIGEN strepA      3. Acute pharyngitis, unspecified etiology  amoxicillin-clavulanate (AUGMENTIN) 875-125 mg per tablet            Patient Instructions     Start antibiotic and take with food  Probiotics or yogurt to replace the good bacteria in your gut  Over the counter cold medication to control symptoms  Drink plenty of fluids  If symptoms fail to improve follow up with PCP  If symptoms worsen have yourself rechecked    Follow up with PCP in 3-5 days.  Proceed to  ER if symptoms worsen.    If tests have been performed at [facility], our office will contact you with results if changes need to be made to the care plan discussed with you at the visit.  You can review your full results on [facility].    Chief Complaint     Chief Complaint   Patient presents with   • Cold Like Symptoms     Sinus pressure and cold symptoms  started Friday, started with sore throat yesterday. Worsening today. Body aches no fever. Said he gets short of breath.         History of Present Illness       Patient presents with a 2-day history of fever to unknown degree, chills, sinus pressure with purulent nasal drainage, sore throat, cough and bodyaches.  States his sore throat became worse today.  Denies GI symptoms.  Point-of-care strep test negative.  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        Review of Systems   Review of Systems   Constitutional:  Positive for chills and fever.   HENT:  Positive for sinus pressure and sore throat.    Respiratory:  Positive for cough.    Musculoskeletal:  Positive for myalgias.  "        Current Medications       Current Outpatient Medications:   •  amoxicillin-clavulanate (AUGMENTIN) 875-125 mg per tablet, Take 1 tablet by mouth 2 (two) times a day for 7 days, Disp: 14 tablet, Rfl: 0  •  methylPREDNISolone 4 MG tablet therapy pack, Use as directed on package (Patient not taking: Reported on 2/2/2025), Disp: 21 each, Rfl: 0  •  promethazine-dextromethorphan (PHENERGAN-DM) 6.25-15 mg/5 mL oral syrup, Take 5 mL by mouth 4 (four) times a day as needed for cough (Patient not taking: Reported on 2/2/2025), Disp: 180 mL, Rfl: 0    Current Allergies     Allergies as of 02/02/2025 - Reviewed 02/02/2025   Allergen Reaction Noted   • Vaseline [petrolatum] Rash 05/09/2022            The following portions of the patient's history were reviewed and updated as appropriate: allergies, current medications, past family history, past medical history, past social history, past surgical history and problem list.     Past Medical History:   Diagnosis Date   • Vitamin D deficiency        Past Surgical History:   Procedure Laterality Date   • WISDOM TOOTH EXTRACTION  01/20/2023    [place] x 4; abscessed left lower       History reviewed. No pertinent family history.      Medications have been verified.        Objective   /88   Pulse (!) 122   Temp 98.8 °F (37.1 °C)   Resp 22   Ht 6' 3\" (1.905 m)   Wt 93 kg (205 lb)   SpO2 98%   BMI 25.62 kg/m²   No LMP for male patient.       Physical Exam     Physical Exam  Vitals and nursing note reviewed.   Constitutional:       Appearance: Normal appearance.   HENT:      Head: Normocephalic and atraumatic.      Right Ear: Tympanic membrane normal.      Left Ear: Tympanic membrane normal.      Mouth/Throat:      Mouth: Mucous membranes are moist.      Pharynx: Posterior oropharyngeal erythema present.   Eyes:      Conjunctiva/sclera: Conjunctivae normal.   Cardiovascular:      Rate and Rhythm: Normal rate and regular rhythm.      Heart sounds: Normal heart sounds. "   Pulmonary:      Effort: Pulmonary effort is normal.      Breath sounds: Normal breath sounds.   Musculoskeletal:      Cervical back: Neck supple.   Lymphadenopathy:      Cervical: No cervical adenopathy.   Neurological:      Mental Status: He is alert.

## 2025-03-02 ENCOUNTER — OFFICE VISIT (OUTPATIENT)
Dept: URGENT CARE | Facility: CLINIC | Age: 30
End: 2025-03-02
Payer: COMMERCIAL

## 2025-03-02 VITALS
SYSTOLIC BLOOD PRESSURE: 138 MMHG | TEMPERATURE: 98.9 F | OXYGEN SATURATION: 98 % | DIASTOLIC BLOOD PRESSURE: 86 MMHG | RESPIRATION RATE: 18 BRPM | HEART RATE: 106 BPM

## 2025-03-02 DIAGNOSIS — J01.90 ACUTE SINUSITIS, RECURRENCE NOT SPECIFIED, UNSPECIFIED LOCATION: Primary | ICD-10-CM

## 2025-03-02 PROCEDURE — S9083 URGENT CARE CENTER GLOBAL: HCPCS | Performed by: PHYSICIAN ASSISTANT

## 2025-03-02 PROCEDURE — G0382 LEV 3 HOSP TYPE B ED VISIT: HCPCS | Performed by: PHYSICIAN ASSISTANT

## 2025-03-02 RX ORDER — AMOXICILLIN 875 MG/1
875 TABLET, COATED ORAL 2 TIMES DAILY
Qty: 20 TABLET | Refills: 0 | Status: SHIPPED | OUTPATIENT
Start: 2025-03-02 | End: 2025-03-12

## 2025-03-02 NOTE — LETTER
March 2, 2025     Patient: Demario Sibley   YOB: 1995   Date of Visit: 3/2/2025       To Whom It May Concern:    It is my medical opinion that Demario Sibley may return to work on 3/2/25.    If you have any questions or concerns, please don't hesitate to call.         Sincerely,        Michelle Behler, PA-C    CC: No Recipients

## 2025-03-02 NOTE — PROGRESS NOTES
[facility]    NAME: Demario Sibley is a 29 y.o. male  : 1995    MRN: [id number]  DATE: 2025  TIME: 3:22 PM    Assessment and Plan   Acute sinusitis, recurrence not specified, unspecified location [J01.90]  1. Acute sinusitis, recurrence not specified, unspecified location  amoxicillin (AMOXIL) 875 mg tablet          Patient Instructions     Patient Instructions   I have prescribed an antibiotic for the infection.  Please take the antibiotic as prescribed and finish the entire prescription.  Take an over the counter probiotic or eat yogurt with live cultures in it (activia) to keep good bacteria in the gut and help prevent diarrhea.  Wash hands frequently to prevent the spread of infection.  Can use over the counter cough and cold medications to help with symptoms.  Ibuprofen and/or tylenol as needed for pain or fever.  If not improving over the next 7-10 days, follow up with PCP.          Chief Complaint     Chief Complaint   Patient presents with    Nasal Congestion     Sinus pressure pain 3 days        History of Present Illness   29-year-old male here with complaint of sinus pressure and sinus congestion after flying 4 days ago.  Has had thick nasal mucus since.  A lot of sinus pain.        Review of Systems   Review of Systems   Constitutional:  Negative for chills, fatigue and fever.   HENT:  Positive for congestion, postnasal drip, sinus pressure and sinus pain. Negative for ear pain and sore throat.    Respiratory:  Positive for cough. Negative for shortness of breath and wheezing.    Neurological:  Positive for headaches.   All other systems reviewed and are negative.      Current Medications     Current Outpatient Medications:     amoxicillin (AMOXIL) 875 mg tablet, Take 1 tablet (875 mg total) by mouth 2 (two) times a day for 10 days, Disp: 20 tablet, Rfl: 0    methylPREDNISolone 4 MG tablet therapy pack, Use as directed on package (Patient not taking: Reported on 3/2/2025),  Disp: 21 each, Rfl: 0    promethazine-dextromethorphan (PHENERGAN-DM) 6.25-15 mg/5 mL oral syrup, Take 5 mL by mouth 4 (four) times a day as needed for cough (Patient not taking: Reported on 3/2/2025), Disp: 180 mL, Rfl: 0    Current Allergies     Allergies as of 03/02/2025 - Reviewed 03/02/2025   Allergen Reaction Noted    Vaseline [petrolatum] Rash 05/09/2022          The following portions of the patient's history were reviewed and updated as appropriate: allergies, current medications, past family history, past medical history, past social history, past surgical history and problem list.   Past Medical History:   Diagnosis Date    Vitamin D deficiency      Past Surgical History:   Procedure Laterality Date    WISDOM TOOTH EXTRACTION  01/20/2023    [place] x 4; abscessed left lower     No family history on file.  Social History     Socioeconomic History    Marital status: Single     Spouse name: Not on file    Number of children: Not on file    Years of education: Not on file    Highest education level: Not on file   Occupational History    Not on file   Tobacco Use    Smoking status: Never     Passive exposure: Never    Smokeless tobacco: Current     Types: Chew   Vaping Use    Vaping status: Never Used   Substance and Sexual Activity    Alcohol use: Yes     Comment: occassional    Drug use: No    Sexual activity: Not on file   Other Topics Concern    Not on file   Social History Narrative    Not on file     Social Drivers of Health     Financial Resource Strain: Not on file   Food Insecurity: Not on file   Transportation Needs: Not on file   Physical Activity: Not on file   Stress: Not on file   Social Connections: Unknown (6/18/2024)    Received from [employer]    Social [employer]     How often do you feel lonely or isolated from those around you? (Adult - for ages 18 years and over): Not on file   Intimate Partner Violence: Not on file   Housing Stability: Not on file     Medications have been  verified.    Objective   /86   Pulse (!) 106   Temp 98.9 °F (37.2 °C)   Resp 18   SpO2 98%      Physical Exam   Physical Exam  Vitals reviewed.   Constitutional:       General: He is not in acute distress.     Appearance: He is well-developed.   HENT:      Head: Normocephalic and atraumatic.      Right Ear: Tympanic membrane normal.      Left Ear: Tympanic membrane normal.      Nose: Congestion present. No mucosal edema.      Mouth/Throat:      Pharynx: Posterior oropharyngeal erythema present.   Cardiovascular:      Rate and Rhythm: Normal rate and regular rhythm.      Heart sounds: Normal heart sounds.   Pulmonary:      Effort: Pulmonary effort is normal. No respiratory distress.      Breath sounds: Normal breath sounds.

## 2025-03-12 ENCOUNTER — OFFICE VISIT (OUTPATIENT)
Dept: FAMILY MEDICINE CLINIC | Facility: CLINIC | Age: 30
End: 2025-03-12
Payer: COMMERCIAL

## 2025-03-12 VITALS
SYSTOLIC BLOOD PRESSURE: 124 MMHG | HEIGHT: 75 IN | WEIGHT: 208 LBS | BODY MASS INDEX: 25.86 KG/M2 | TEMPERATURE: 98.6 F | RESPIRATION RATE: 20 BRPM | HEART RATE: 84 BPM | DIASTOLIC BLOOD PRESSURE: 68 MMHG

## 2025-03-12 DIAGNOSIS — A09 TRAVELER'S DIARRHEA: Primary | ICD-10-CM

## 2025-03-12 DIAGNOSIS — F40.243 ANXIETY WITH FLYING: ICD-10-CM

## 2025-03-12 PROCEDURE — 99214 OFFICE O/P EST MOD 30 MIN: CPT | Performed by: FAMILY MEDICINE

## 2025-03-12 RX ORDER — ALPRAZOLAM 0.5 MG
0.5 TABLET ORAL 2 TIMES DAILY PRN
Qty: 8 TABLET | Refills: 0 | Status: SHIPPED | OUTPATIENT
Start: 2025-03-12

## 2025-03-12 NOTE — PROGRESS NOTES
Name: Demario Sibley      : 1995      MRN: [id number]  Encounter Provider: Estuardo Okeefe DO  Encounter Date: 3/12/2025   Encounter department: [facility]  :  Assessment & Plan  Traveler's diarrhea  We will obtain a stool culture.  The patient was advised to use Pepto-Bismol until the results are back  Orders:  •  Stool Enteric Bacterial Panel by PCR; Future    Anxiety with flying  We will provide Xanax 0.5 up to 2 times daily as needed  Orders:  •  ALPRAZolam (XANAX) 0.5 mg tablet; Take 1 tablet (0.5 mg total) by mouth 2 (two) times a day as needed for anxiety           History of Present Illness   Patient presents with a 2-week history of loose bowel movements and diarrhea without abdominal pain.  The diarrhea began after a trip to [place].  The patient also complains of anxiety with flying.  States he will be flying to [place] in the near future.    Anxiety  Symptoms include nervous/anxious behavior. Patient reports no chest pain, palpitations or shortness of breath.       Diarrhea   Pertinent negatives include no abdominal pain, arthralgias, chills, coughing, fever or vomiting.     Review of Systems   Constitutional:  Negative for chills and fever.   HENT:  Negative for ear pain and sore throat.    Eyes:  Negative for pain and visual disturbance.   Respiratory:  Negative for cough and shortness of breath.    Cardiovascular:  Negative for chest pain and palpitations.   Gastrointestinal:  Positive for diarrhea. Negative for abdominal pain and vomiting.   Genitourinary:  Negative for dysuria and hematuria.   Musculoskeletal:  Negative for arthralgias and back pain.   Skin:  Negative for color change and rash.   Neurological:  Negative for seizures and syncope.   Psychiatric/Behavioral:  The patient is nervous/anxious.         Anxious nervous about flying   All other systems reviewed and are negative.      Objective   /68   Pulse 84   Temp 98.6 °F (37 °C)   Resp 20   " Ht 6' 3\" (1.905 m)   Wt 94.3 kg (208 lb)   BMI 26.00 kg/m²      Physical Exam  Constitutional:       Appearance: Normal appearance.   HENT:      Head: Normocephalic and atraumatic.      Right Ear: Tympanic membrane, ear canal and external ear normal.      Left Ear: Tympanic membrane, ear canal and external ear normal.      Nose: Nose normal.      Mouth/Throat:      Mouth: Mucous membranes are moist.      Pharynx: Oropharynx is clear.   Eyes:      Extraocular Movements: Extraocular movements intact.      Conjunctiva/sclera: Conjunctivae normal.      Pupils: Pupils are equal, round, and reactive to light.   Cardiovascular:      Rate and Rhythm: Normal rate and regular rhythm.      Pulses: Normal pulses.      Heart sounds: Normal heart sounds.   Pulmonary:      Effort: Pulmonary effort is normal.      Breath sounds: Normal breath sounds.   Abdominal:      General: Abdomen is flat. Bowel sounds are normal.      Palpations: Abdomen is soft.   Musculoskeletal:         General: Normal range of motion.      Cervical back: Normal range of motion.   Skin:     General: Skin is warm and dry.      Capillary Refill: Capillary refill takes less than 2 seconds.   Neurological:      General: No focal deficit present.      Mental Status: He is alert and oriented to person, place, and time.   Psychiatric:         Mood and Affect: Mood normal.         Behavior: Behavior normal.         "

## 2025-04-02 ENCOUNTER — OFFICE VISIT (OUTPATIENT)
Dept: URGENT CARE | Facility: MEDICAL CENTER | Age: 30
End: 2025-04-02
Payer: COMMERCIAL

## 2025-04-02 VITALS
TEMPERATURE: 98.3 F | RESPIRATION RATE: 16 BRPM | SYSTOLIC BLOOD PRESSURE: 130 MMHG | HEART RATE: 92 BPM | DIASTOLIC BLOOD PRESSURE: 80 MMHG | OXYGEN SATURATION: 99 %

## 2025-04-02 DIAGNOSIS — J06.9 ACUTE URI: Primary | ICD-10-CM

## 2025-04-02 PROCEDURE — G0382 LEV 3 HOSP TYPE B ED VISIT: HCPCS

## 2025-04-02 PROCEDURE — S9083 URGENT CARE CENTER GLOBAL: HCPCS

## 2025-04-02 NOTE — LETTER
April 2, 2025     Patient: Demario Sibley   YOB: 1995   Date of Visit: 4/2/2025       To Whom it May Concern:    Demario Sibley was seen in my clinic on 4/2/2025. He may return to work on 4/2/25 .    If you have any questions or concerns, please don't hesitate to call.         Sincerely,          Loki Coburn PA-C        CC: Estuardo Okeefe, DO

## 2025-04-02 NOTE — PROGRESS NOTES
[facility]  Name: Demario Sibley      : 1995      MRN: [id number]  Encounter Provider: Loki Coburn PA-C  Encounter Date: 2025   Encounter department: [facility] [place]  :  Assessment & Plan  Acute URI         Patient vitals and physical exam appear to be stable.  Asked for note to return back to work today.  Note provided able to work back on 2025.    Patient Instructions  Follow up with PCP in 3-5 days.  Proceed to  ER if symptoms worsen.    If tests are performed, our office will contact you with results only if changes need to made to the care plan discussed with you at the visit. You can review your full results on [facility].    Chief Complaint:   Chief Complaint   Patient presents with    School/Work Issues     History of Present Illness   29-year-old male presenting with cold symptoms x 5 days.  Patient states that he was seeing his mother in the hospital had COPD she is being treated for bilateral pneumonia.  States that while visiting in the hospital he believes he may have gotten sick and started to have cold symptoms including fevers, chills, body aches and as well as cough, congestion and slight sore throat.  States that most of his symptoms resolved over the last for 5 days remaining with minor cough and congestion however he had to take off work to feel better as well as take care of his mother who was recently discharged from the hospital.  States that she is now on oxygen and has to carry around her oxygen tank going about 50 to 60 pounds.  States that since he is feeling better and his dad started to help with mom he is able to go back to work today and would like a note to go back to work today.  Used TheraFlu as well as ibuprofen Tylenol for symptom relief and does not feel like he needs any other medication relief.          Review of Systems   Constitutional:  Negative for chills, fatigue and fever.   HENT:  Positive for congestion. Negative for  ear pain, rhinorrhea and sore throat.    Respiratory:  Positive for cough. Negative for shortness of breath.    Cardiovascular:  Negative for chest pain and palpitations.   Gastrointestinal:  Negative for abdominal pain, diarrhea, nausea and vomiting.   Musculoskeletal:  Negative for arthralgias and myalgias.   Neurological:  Negative for light-headedness and headaches.     Past Medical History   Past Medical History:   Diagnosis Date    Vitamin D deficiency      Past Surgical History:   Procedure Laterality Date    WISDOM TOOTH EXTRACTION  01/20/2023    [place] x 4; abscessed left lower     History reviewed. No pertinent family history.  he reports that he has never smoked. He has never been exposed to tobacco smoke. His smokeless tobacco use includes chew. He reports current alcohol use. He reports that he does not use drugs.  Current Outpatient Medications   Medication Instructions    ALPRAZolam (XANAX) 0.5 mg, Oral, 2 times daily PRN     Allergies   Allergen Reactions    Vaseline [Petrolatum] Rash        Objective   /80   Pulse 92   Temp 98.3 °F (36.8 °C)   Resp 16   SpO2 99%      Physical Exam  Vitals and nursing note reviewed.   Constitutional:       General: He is not in acute distress.     Appearance: He is normal weight.   HENT:      Head: Normocephalic and atraumatic.      Right Ear: Tympanic membrane, ear canal and external ear normal.      Left Ear: Tympanic membrane, ear canal and external ear normal.      Nose: Congestion present.      Mouth/Throat:      Mouth: Mucous membranes are moist.      Pharynx: Oropharynx is clear. No oropharyngeal exudate.   Eyes:      General:         Right eye: No discharge.         Left eye: No discharge.      Conjunctiva/sclera: Conjunctivae normal.      Pupils: Pupils are equal, round, and reactive to light.   Cardiovascular:      Rate and Rhythm: Normal rate and regular rhythm.      Pulses: Normal pulses.      Heart sounds: Normal heart sounds.   Pulmonary:       "Effort: Pulmonary effort is normal. No respiratory distress.      Breath sounds: Normal breath sounds. No wheezing.   Abdominal:      General: Abdomen is flat. Bowel sounds are normal.      Tenderness: There is no abdominal tenderness.   Lymphadenopathy:      Cervical: No cervical adenopathy.   Skin:     General: Skin is warm.      Capillary Refill: Capillary refill takes less than 2 seconds.   Neurological:      General: No focal deficit present.      Mental Status: He is alert and oriented to person, place, and time.   Psychiatric:         Mood and Affect: Mood normal.         Behavior: Behavior normal.         Portions of the record may have been created with voice recognition software.  Occasional wrong word or \"sound a like\" substitutions may have occurred due to the inherent limitations of voice recognition software.  Read the chart carefully and recognize, using context, where substitutions have occurred.  "

## 2025-04-03 ENCOUNTER — TELEPHONE (OUTPATIENT)
Age: 30
End: 2025-04-03

## 2025-04-03 NOTE — LETTER
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      If you have any questions or concerns, please don't hesitate to call.         Sincerely,          LESA GomezO.

## 2025-04-03 NOTE — TELEPHONE ENCOUNTER
Patient was in with his mom who came in for a TCM. Patient was given a note for work but his work is not accepting the note he received from the office. He is asking for a addendum on the note as he took off work from 3/31-4/2 to take care of his mom after her hospital stay. Patient will need this note asap. Please advise.

## 2025-04-03 NOTE — TELEPHONE ENCOUNTER
Spoke to patient; patient will  note. 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 Patient considering [facility] paperwork due to her.

## 2025-05-15 ENCOUNTER — OFFICE VISIT (OUTPATIENT)
Dept: FAMILY MEDICINE CLINIC | Facility: CLINIC | Age: 30
End: 2025-05-15
Payer: COMMERCIAL

## 2025-05-15 VITALS
SYSTOLIC BLOOD PRESSURE: 134 MMHG | HEART RATE: 76 BPM | BODY MASS INDEX: 25.61 KG/M2 | WEIGHT: 206 LBS | DIASTOLIC BLOOD PRESSURE: 84 MMHG | TEMPERATURE: 98.2 F | RESPIRATION RATE: 16 BRPM | HEIGHT: 75 IN

## 2025-05-15 DIAGNOSIS — E55.9 VITAMIN D DEFICIENCY: ICD-10-CM

## 2025-05-15 DIAGNOSIS — R07.9 CHEST PAIN IN ADULT: ICD-10-CM

## 2025-05-15 DIAGNOSIS — R01.1 CARDIAC MURMUR: ICD-10-CM

## 2025-05-15 DIAGNOSIS — H66.93 OTITIS OF BOTH EARS: ICD-10-CM

## 2025-05-15 DIAGNOSIS — Z13.29 SCREENING FOR THYROID DISORDER: ICD-10-CM

## 2025-05-15 DIAGNOSIS — J01.01 ACUTE RECURRENT MAXILLARY SINUSITIS: Primary | ICD-10-CM

## 2025-05-15 DIAGNOSIS — Z13.6 SCREENING FOR CARDIOVASCULAR CONDITION: ICD-10-CM

## 2025-05-15 DIAGNOSIS — J20.9 ACUTE BRONCHITIS, UNSPECIFIED ORGANISM: ICD-10-CM

## 2025-05-15 PROCEDURE — 99214 OFFICE O/P EST MOD 30 MIN: CPT | Performed by: FAMILY MEDICINE

## 2025-05-15 RX ORDER — METHYLPREDNISOLONE 4 MG/1
TABLET ORAL
Qty: 21 EACH | Refills: 0 | Status: SHIPPED | OUTPATIENT
Start: 2025-05-15

## 2025-05-15 RX ORDER — AZITHROMYCIN 250 MG/1
TABLET, FILM COATED ORAL
Qty: 6 TABLET | Refills: 0 | Status: SHIPPED | OUTPATIENT
Start: 2025-05-15 | End: 2025-05-20

## 2025-05-15 RX ORDER — DEXTROMETHORPHAN HYDROBROMIDE AND PROMETHAZINE HYDROCHLORIDE 15; 6.25 MG/5ML; MG/5ML
5 SYRUP ORAL 4 TIMES DAILY PRN
Qty: 180 ML | Refills: 0 | Status: SHIPPED | OUTPATIENT
Start: 2025-05-15

## 2025-05-15 NOTE — PROGRESS NOTES
Name: Demario Sibley      : 1995      MRN: [id number]  Encounter Provider: Estuardo Okeefe DO  Encounter Date: 5/15/2025   Encounter department: [facility]  :  Assessment & Plan  Acute recurrent maxillary sinusitis  We will provide a Medrol Dosepak Zithromax Z-DIAN and Promethazine DM  Orders:  •  azithromycin (Zithromax) 250 mg tablet; Take 2 tablets (500 mg total) by mouth daily for 1 day, THEN 1 tablet (250 mg total) daily for 4 days.  •  methylPREDNISolone 4 MG tablet therapy pack; Use as directed on package    Acute bronchitis, unspecified organism  As above  Orders:  •  promethazine-dextromethorphan (PHENERGAN-DM) 6.25-15 mg/5 mL oral syrup; Take 5 mL by mouth 4 (four) times a day as needed for cough    Otitis of both ears  As above       Chest pain in adult  We will obtain a resting EKG and exercise stress test  Orders:  •  CBC and differential; Future  •  Comprehensive metabolic panel; Future  •  ECG 12 lead; Future  •  ECG 12 lead; Future  •  Echo follow up/limited w/ contrast if indicated; Future    Cardiac murmur  We will obtain an echocardiogram the last echocardiogram performed on the patient was in 2016.  That echo revealed a trace of tricuspid regurgitation.  Orders:  •  Echo follow up/limited w/ contrast if indicated; Future    Screening for cardiovascular condition    Orders:  •  Lipid Panel With Direct LDL; Future    Screening for thyroid disorder    Orders:  •  TSH, 3rd generation; Future    Vitamin D deficiency    Orders:  •  Vitamin D 25 hydroxy; Future           History of Present Illness   The patient presents with a chief complaint of cold like symptoms for the last 2 days with nasal congestion headache earache bilaterally facial pain.  The patient also states that he had while in bed substernal severe chest pain with diaphoresis he states that the pain lasted about an hour.  He states that he had sushi before going to bed.    Headache  Earache  "  Associated symptoms include coughing and headaches. Pertinent negatives include no abdominal pain, rash, sore throat or vomiting.   Chest Pain   Associated symptoms include a cough and headaches. Pertinent negatives include no abdominal pain, back pain, fever, palpitations, shortness of breath or vomiting.   Pertinent negatives for past medical history include no seizures.     Review of Systems   Constitutional:  Negative for chills and fever.   HENT:  Positive for ear pain. Negative for sore throat.    Eyes:  Negative for pain and visual disturbance.   Respiratory:  Positive for cough. Negative for shortness of breath.    Cardiovascular:  Positive for chest pain. Negative for palpitations.   Gastrointestinal:  Negative for abdominal pain and vomiting.   Genitourinary:  Negative for dysuria and hematuria.   Musculoskeletal:  Negative for arthralgias and back pain.   Skin:  Negative for color change and rash.   Neurological:  Positive for headaches. Negative for seizures and syncope.   All other systems reviewed and are negative.      Objective   /84   Pulse 76   Temp 98.2 °F (36.8 °C)   Resp 16   Ht 6' 3\" (1.905 m)   Wt 93.4 kg (206 lb)   BMI 25.75 kg/m²      Physical Exam  Vitals and nursing note reviewed.   Constitutional:       General: He is not in acute distress.     Appearance: Normal appearance. He is well-developed.   HENT:      Head: Normocephalic and atraumatic.      Right Ear: Ear canal and external ear normal. Tympanic membrane is erythematous and bulging.      Left Ear: Ear canal and external ear normal. Tympanic membrane is erythematous and bulging.      Nose: Congestion and rhinorrhea present.      Right Sinus: Maxillary sinus tenderness present.      Left Sinus: Maxillary sinus tenderness present.      Mouth/Throat:      Mouth: Mucous membranes are moist.      Pharynx: Oropharynx is clear.     Eyes:      Extraocular Movements: Extraocular movements intact.      Conjunctiva/sclera: " Conjunctivae normal.      Pupils: Pupils are equal, round, and reactive to light.       Cardiovascular:      Rate and Rhythm: Normal rate and regular rhythm.      Pulses: Normal pulses.      Heart sounds: Murmur heard.   Pulmonary:      Effort: Pulmonary effort is normal. No respiratory distress.      Breath sounds: Normal breath sounds.   Abdominal:      General: Abdomen is flat. Bowel sounds are normal.      Palpations: Abdomen is soft.      Tenderness: There is no abdominal tenderness.     Musculoskeletal:         General: No swelling. Normal range of motion.      Cervical back: Normal range of motion and neck supple.     Skin:     General: Skin is warm and dry.      Capillary Refill: Capillary refill takes less than 2 seconds.     Neurological:      General: No focal deficit present.      Mental Status: He is alert and oriented to person, place, and time.     Psychiatric:         Mood and Affect: Mood normal.         Behavior: Behavior normal.

## 2025-05-16 ENCOUNTER — APPOINTMENT (OUTPATIENT)
Dept: LAB | Facility: CLINIC | Age: 30
End: 2025-05-16
Payer: COMMERCIAL

## 2025-05-16 DIAGNOSIS — E55.9 VITAMIN D DEFICIENCY: ICD-10-CM

## 2025-05-16 DIAGNOSIS — R07.9 CHEST PAIN IN ADULT: ICD-10-CM

## 2025-05-16 DIAGNOSIS — Z13.6 SCREENING FOR CARDIOVASCULAR CONDITION: ICD-10-CM

## 2025-05-16 DIAGNOSIS — Z13.29 SCREENING FOR THYROID DISORDER: ICD-10-CM

## 2025-05-16 LAB
25(OH)D3 SERPL-MCNC: 21.1 NG/ML (ref 30–100)
ALBUMIN SERPL BCG-MCNC: 4.8 G/DL (ref 3.5–5)
ALP SERPL-CCNC: 77 U/L (ref 34–104)
ALT SERPL W P-5'-P-CCNC: 104 U/L (ref 7–52)
ANION GAP SERPL CALCULATED.3IONS-SCNC: 11 MMOL/L (ref 4–13)
AST SERPL W P-5'-P-CCNC: 58 U/L (ref 13–39)
BASOPHILS # BLD AUTO: 0.07 THOUSANDS/ÂΜL (ref 0–0.1)
BASOPHILS NFR BLD AUTO: 1 % (ref 0–1)
BILIRUB SERPL-MCNC: 0.79 MG/DL (ref 0.2–1)
BUN SERPL-MCNC: 11 MG/DL (ref 5–25)
CALCIUM SERPL-MCNC: 9.7 MG/DL (ref 8.4–10.2)
CHLORIDE SERPL-SCNC: 100 MMOL/L (ref 96–108)
CHOLEST SERPL-MCNC: 251 MG/DL (ref ?–200)
CO2 SERPL-SCNC: 28 MMOL/L (ref 21–32)
CREAT SERPL-MCNC: 1.06 MG/DL (ref 0.6–1.3)
EOSINOPHIL # BLD AUTO: 0.45 THOUSAND/ÂΜL (ref 0–0.61)
EOSINOPHIL NFR BLD AUTO: 7 % (ref 0–6)
ERYTHROCYTE [DISTWIDTH] IN BLOOD BY AUTOMATED COUNT: 11.7 % (ref 11.6–15.1)
GFR SERPL CREATININE-BSD FRML MDRD: 95 ML/MIN/1.73SQ M
GLUCOSE P FAST SERPL-MCNC: 107 MG/DL (ref 65–99)
HCT VFR BLD AUTO: 45.8 % (ref 36.5–49.3)
HDLC SERPL-MCNC: 83 MG/DL
HGB BLD-MCNC: 15.7 G/DL (ref 12–17)
IMM GRANULOCYTES # BLD AUTO: 0.02 THOUSAND/UL (ref 0–0.2)
IMM GRANULOCYTES NFR BLD AUTO: 0 % (ref 0–2)
LDLC SERPL CALC-MCNC: 126 MG/DL (ref 0–100)
LYMPHOCYTES # BLD AUTO: 1.81 THOUSANDS/ÂΜL (ref 0.6–4.47)
LYMPHOCYTES NFR BLD AUTO: 26 % (ref 14–44)
MCH RBC QN AUTO: 31.8 PG (ref 26.8–34.3)
MCHC RBC AUTO-ENTMCNC: 34.3 G/DL (ref 31.4–37.4)
MCV RBC AUTO: 93 FL (ref 82–98)
MONOCYTES # BLD AUTO: 0.71 THOUSAND/ÂΜL (ref 0.17–1.22)
MONOCYTES NFR BLD AUTO: 10 % (ref 4–12)
NEUTROPHILS # BLD AUTO: 3.87 THOUSANDS/ÂΜL (ref 1.85–7.62)
NEUTS SEG NFR BLD AUTO: 56 % (ref 43–75)
NRBC BLD AUTO-RTO: 0 /100 WBCS
PLATELET # BLD AUTO: 298 THOUSANDS/UL (ref 149–390)
PMV BLD AUTO: 9.1 FL (ref 8.9–12.7)
POTASSIUM SERPL-SCNC: 4.7 MMOL/L (ref 3.5–5.3)
PROT SERPL-MCNC: 7.5 G/DL (ref 6.4–8.4)
RBC # BLD AUTO: 4.93 MILLION/UL (ref 3.88–5.62)
SODIUM SERPL-SCNC: 139 MMOL/L (ref 135–147)
TRIGL SERPL-MCNC: 210 MG/DL (ref ?–150)
TSH SERPL DL<=0.05 MIU/L-ACNC: 1.25 UIU/ML (ref 0.45–4.5)
WBC # BLD AUTO: 6.93 THOUSAND/UL (ref 4.31–10.16)

## 2025-05-16 PROCEDURE — 80061 LIPID PANEL: CPT

## 2025-05-16 PROCEDURE — 84443 ASSAY THYROID STIM HORMONE: CPT

## 2025-05-16 PROCEDURE — 80053 COMPREHEN METABOLIC PANEL: CPT

## 2025-05-16 PROCEDURE — 36415 COLL VENOUS BLD VENIPUNCTURE: CPT

## 2025-05-16 PROCEDURE — 85025 COMPLETE CBC W/AUTO DIFF WBC: CPT

## 2025-05-16 PROCEDURE — 82306 VITAMIN D 25 HYDROXY: CPT

## 2025-05-19 ENCOUNTER — RESULTS FOLLOW-UP (OUTPATIENT)
Dept: FAMILY MEDICINE CLINIC | Facility: CLINIC | Age: 30
End: 2025-05-19

## 2025-05-19 NOTE — TELEPHONE ENCOUNTER
Pt called and scheduled an appt for May 30, if the Dr would like to see him sooner than that please call pt to reschedule

## 2025-05-19 NOTE — TELEPHONE ENCOUNTER
----- Message from Estuardo Okeefe DO sent at 5/19/2025 12:57 PM EDT -----  Please call the patient regarding his abnormal result.  Schedule an appointment to go over lab  ----- Message -----  From: Lab, Background User  Sent: 5/16/2025   3:19 PM EDT  To: Estuardo Okeefe DO

## 2025-05-19 NOTE — TELEPHONE ENCOUNTER
Called patient left message on voicemail for a return call to schedule an appointment to review lab work.  When patient calls back please schedule a follow up appointment.

## 2025-05-20 ENCOUNTER — TELEPHONE (OUTPATIENT)
Dept: FAMILY MEDICINE CLINIC | Facility: CLINIC | Age: 30
End: 2025-05-20

## 2025-05-30 ENCOUNTER — OFFICE VISIT (OUTPATIENT)
Dept: FAMILY MEDICINE CLINIC | Facility: CLINIC | Age: 30
End: 2025-05-30
Payer: COMMERCIAL

## 2025-05-30 VITALS
WEIGHT: 209.4 LBS | DIASTOLIC BLOOD PRESSURE: 68 MMHG | HEART RATE: 89 BPM | BODY MASS INDEX: 26.04 KG/M2 | SYSTOLIC BLOOD PRESSURE: 122 MMHG | TEMPERATURE: 98.6 F | HEIGHT: 75 IN | OXYGEN SATURATION: 98 %

## 2025-05-30 DIAGNOSIS — E55.9 VITAMIN D DEFICIENCY: Primary | ICD-10-CM

## 2025-05-30 DIAGNOSIS — E78.5 DYSLIPIDEMIA: ICD-10-CM

## 2025-05-30 PROCEDURE — 99213 OFFICE O/P EST LOW 20 MIN: CPT | Performed by: FAMILY MEDICINE

## 2025-05-30 RX ORDER — ERGOCALCIFEROL 1.25 MG/1
50000 CAPSULE, LIQUID FILLED ORAL WEEKLY
Qty: 12 CAPSULE | Refills: 0 | Status: SHIPPED | OUTPATIENT
Start: 2025-05-30

## 2025-05-30 RX ORDER — AMOXICILLIN 500 MG
CAPSULE ORAL
COMMUNITY

## 2025-05-30 RX ORDER — DIPHENOXYLATE HYDROCHLORIDE AND ATROPINE SULFATE 2.5; .025 MG/1; MG/1
1 TABLET ORAL DAILY
COMMUNITY

## 2025-05-30 NOTE — PROGRESS NOTES
"Name: Demario Sibley      : 1995      MRN: [id number]  Encounter Provider: Estuardo Okeefe DO  Encounter Date: 2025   Encounter department: [facility]  :  Assessment & Plan  Vitamin D deficiency  We will supplement vitamin D with 50,000 units weekly for 3 months then will follow through with 5000 units daily with a recheck on vitamin D level in 6 months  Orders:  •  ergocalciferol (VITAMIN D2) 50,000 units; Take 1 capsule (50,000 Units total) by mouth once a week  •  Vitamin D 25 hydroxy; Future    Dyslipidemia  A total cholesterol of 251 triglycerides of 210 HDL of 83 and LDL of 126.  Discussed with the patient that he is young and of low risk that we will replace the vitamin D and he is to try a low saturated fat diet with a recheck on a lipid panel in 6 months  Orders:  •  Lipid Panel with Direct LDL reflex; Future           History of Present Illness   Patient presents to go over his lab work      Review of Systems   Constitutional:  Negative for chills and fever.   HENT:  Negative for ear pain and sore throat.    Eyes:  Negative for pain and visual disturbance.   Respiratory:  Negative for cough and shortness of breath.    Cardiovascular:  Negative for chest pain and palpitations.   Gastrointestinal:  Negative for abdominal pain and vomiting.   Genitourinary:  Negative for dysuria and hematuria.   Musculoskeletal:  Negative for arthralgias and back pain.   Skin:  Negative for color change and rash.   Neurological:  Negative for seizures and syncope.   All other systems reviewed and are negative.      Objective   /68 (BP Location: Left arm, Patient Position: Sitting)   Pulse 89   Temp 98.6 °F (37 °C) (Tympanic)   Ht 6' 3\" (1.905 m)   Wt 95 kg (209 lb 6.4 oz)   SpO2 98%   BMI 26.17 kg/m²      Physical Exam  Vitals and nursing note reviewed.   Constitutional:       General: He is not in acute distress.     Appearance: Normal appearance. He is " well-developed.   HENT:      Head: Normocephalic and atraumatic.      Right Ear: Tympanic membrane, ear canal and external ear normal.      Left Ear: Tympanic membrane, ear canal and external ear normal.      Nose: Nose normal.      Mouth/Throat:      Mouth: Mucous membranes are moist.      Pharynx: Oropharynx is clear.     Eyes:      Extraocular Movements: Extraocular movements intact.      Conjunctiva/sclera: Conjunctivae normal.      Pupils: Pupils are equal, round, and reactive to light.       Cardiovascular:      Rate and Rhythm: Normal rate and regular rhythm.      Pulses: Normal pulses.      Heart sounds: Normal heart sounds. No murmur heard.  Pulmonary:      Effort: Pulmonary effort is normal. No respiratory distress.      Breath sounds: Normal breath sounds.   Abdominal:      General: Abdomen is flat. Bowel sounds are normal.      Palpations: Abdomen is soft.      Tenderness: There is no abdominal tenderness.     Musculoskeletal:         General: No swelling. Normal range of motion.      Cervical back: Normal range of motion and neck supple.     Skin:     General: Skin is warm and dry.      Capillary Refill: Capillary refill takes less than 2 seconds.     Neurological:      General: No focal deficit present.      Mental Status: He is alert and oriented to person, place, and time.     Psychiatric:         Mood and Affect: Mood normal.         Behavior: Behavior normal.

## 2025-07-11 ENCOUNTER — TELEPHONE (OUTPATIENT)
Dept: FAMILY MEDICINE CLINIC | Facility: CLINIC | Age: 30
End: 2025-07-11

## 2025-07-11 ENCOUNTER — OFFICE VISIT (OUTPATIENT)
Dept: FAMILY MEDICINE CLINIC | Facility: CLINIC | Age: 30
End: 2025-07-11
Payer: COMMERCIAL

## 2025-07-11 VITALS
WEIGHT: 207 LBS | DIASTOLIC BLOOD PRESSURE: 84 MMHG | TEMPERATURE: 98.2 F | HEART RATE: 76 BPM | BODY MASS INDEX: 25.74 KG/M2 | RESPIRATION RATE: 16 BRPM | SYSTOLIC BLOOD PRESSURE: 160 MMHG | HEIGHT: 75 IN

## 2025-07-11 DIAGNOSIS — J01.01 ACUTE RECURRENT MAXILLARY SINUSITIS: Primary | ICD-10-CM

## 2025-07-11 PROCEDURE — 99214 OFFICE O/P EST MOD 30 MIN: CPT | Performed by: FAMILY MEDICINE

## 2025-07-11 RX ORDER — METHYLPREDNISOLONE 4 MG/1
TABLET ORAL
Qty: 21 EACH | Refills: 0 | Status: SHIPPED | OUTPATIENT
Start: 2025-07-11

## 2025-07-11 RX ORDER — AMOXICILLIN 500 MG/1
500 CAPSULE ORAL EVERY 8 HOURS SCHEDULED
Qty: 30 CAPSULE | Refills: 0 | Status: SHIPPED | OUTPATIENT
Start: 2025-07-11 | End: 2025-07-21

## 2025-07-11 RX ORDER — FLUTICASONE PROPIONATE 50 MCG
1 SPRAY, SUSPENSION (ML) NASAL DAILY
Qty: 3 ML | Refills: 1 | Status: SHIPPED | OUTPATIENT
Start: 2025-07-11

## 2025-07-11 NOTE — PROGRESS NOTES
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  The injury has made him prone to maxillary sinusitis.  Patient presents with recurrent maxillary sinusitis.  The plan will be to begin Flonase, a Medrol Dosepak, and amoxicillin 500 mg 3 times daily  Orders:    methylPREDNISolone 4 MG tablet therapy pack; Use as directed on package    amoxicillin (AMOXIL) 500 mg capsule; Take 1 capsule (500 mg total) by mouth every 8 (eight) hours for 10 days    fluticasone (FLONASE) 50 mcg/act nasal spray; 1 spray into each nostril daily  FMLA paperwork has been completed         History of Present Illness   Patient presents with a 2-day history of sinus congestion pain and drainage      Review of Systems   Constitutional:  Negative for chills and fever.   HENT:  Positive for sinus pressure and sinus pain. Negative for ear pain and sore throat.    Eyes:  Negative for pain and visual disturbance.   Respiratory:  Negative for cough and shortness of breath.    Cardiovascular:  Negative for chest pain and palpitations.   Gastrointestinal:  Negative for abdominal pain and vomiting.   Genitourinary:  Negative for dysuria and hematuria.   Musculoskeletal:  Negative for arthralgias and back pain.   Skin:  Negative for color change and rash.   Neurological:  Negative for seizures and syncope.   All other systems reviewed and are negative.      Objective   /84   Pulse 76   Temp 98.2 °F (36.8 °C)   Resp 16   Ht 6' 3\" (1.905 m)   Wt 93.9 kg (207 lb)   BMI 25.87 kg/m²      Physical Exam  Constitutional:       Appearance: Normal appearance.   HENT:      Head: Normocephalic and atraumatic.      Right Ear: Tympanic membrane, ear " canal and external ear normal.      Left Ear: Tympanic membrane, ear canal and external ear normal.      Nose: Nose normal.      Mouth/Throat:      Mouth: Mucous membranes are moist.      Pharynx: Oropharynx is clear.     Eyes:      Extraocular Movements: Extraocular movements intact.      Conjunctiva/sclera: Conjunctivae normal.      Pupils: Pupils are equal, round, and reactive to light.       Cardiovascular:      Rate and Rhythm: Normal rate and regular rhythm.      Pulses: Normal pulses.      Heart sounds: Normal heart sounds.   Pulmonary:      Effort: Pulmonary effort is normal.      Breath sounds: Normal breath sounds.   Abdominal:      General: Abdomen is flat. Bowel sounds are normal.      Palpations: Abdomen is soft.     Musculoskeletal:         General: Normal range of motion.      Cervical back: Normal range of motion.     Skin:     General: Skin is warm and dry.      Capillary Refill: Capillary refill takes less than 2 seconds.     Neurological:      General: No focal deficit present.      Mental Status: He is alert and oriented to person, place, and time.     Psychiatric:         Mood and Affect: Mood normal.         Behavior: Behavior normal.

## 2025-07-11 NOTE — ASSESSMENT & PLAN NOTE
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  The injury has made him prone to maxillary sinusitis.  Patient presents with recurrent maxillary sinusitis.  The plan will be to begin Flonase, a Medrol Dosepak, and amoxicillin 500 mg 3 times daily  Orders:    methylPREDNISolone 4 MG tablet therapy pack; Use as directed on package    amoxicillin (AMOXIL) 500 mg capsule; Take 1 capsule (500 mg total) by mouth every 8 (eight) hours for 10 days    fluticasone (FLONASE) 50 mcg/act nasal spray; 1 spray into each nostril daily  [place] paperwork has been [place]

## 2025-07-11 NOTE — LETTER
July 11, 2025     Patient: Demario Sibley  YOB: 1995  Date of Visit: 7/11/2025      To Whom it May Concern:    Demario Sibley is under my professional care. Demario was seen in my office on 7/11/2025. Please excuse him from work 07/10/2025 and 07/11/2025.    If you have any questions or concerns, please don't hesitate to call.         Sincerely,          Dr. Estuardo Okeefe, DO

## 2025-07-31 ENCOUNTER — TELEPHONE (OUTPATIENT)
Dept: FAMILY MEDICINE CLINIC | Facility: CLINIC | Age: 30
End: 2025-07-31

## 2025-08-08 ENCOUNTER — OFFICE VISIT (OUTPATIENT)
Dept: URGENT CARE | Facility: CLINIC | Age: 30
End: 2025-08-08
Payer: COMMERCIAL

## 2025-08-08 VITALS
RESPIRATION RATE: 18 BRPM | SYSTOLIC BLOOD PRESSURE: 148 MMHG | DIASTOLIC BLOOD PRESSURE: 89 MMHG | OXYGEN SATURATION: 97 % | TEMPERATURE: 98.2 F | HEART RATE: 92 BPM

## 2025-08-08 DIAGNOSIS — G44.89 OTHER HEADACHE SYNDROME: Primary | ICD-10-CM

## 2025-08-08 PROCEDURE — S9083 URGENT CARE CENTER GLOBAL: HCPCS | Performed by: NURSE PRACTITIONER

## 2025-08-08 PROCEDURE — G0382 LEV 3 HOSP TYPE B ED VISIT: HCPCS | Performed by: NURSE PRACTITIONER

## 2025-08-10 PROBLEM — J01.01 ACUTE RECURRENT MAXILLARY SINUSITIS: Status: RESOLVED | Noted: 2025-07-11 | Resolved: 2025-08-10

## 2025-08-11 ENCOUNTER — OFFICE VISIT (OUTPATIENT)
Dept: URGENT CARE | Facility: CLINIC | Age: 30
End: 2025-08-11
Payer: COMMERCIAL

## 2025-08-11 ENCOUNTER — TELEPHONE (OUTPATIENT)
Dept: URGENT CARE | Facility: CLINIC | Age: 30
End: 2025-08-11